# Patient Record
Sex: FEMALE | Race: ASIAN | NOT HISPANIC OR LATINO | Employment: STUDENT | URBAN - METROPOLITAN AREA
[De-identification: names, ages, dates, MRNs, and addresses within clinical notes are randomized per-mention and may not be internally consistent; named-entity substitution may affect disease eponyms.]

---

## 2020-09-17 ENCOUNTER — HOSPITAL ENCOUNTER (EMERGENCY)
Facility: HOSPITAL | Age: 20
Discharge: HOME | End: 2020-09-17
Attending: EMERGENCY MEDICINE | Admitting: EMERGENCY MEDICINE
Payer: COMMERCIAL

## 2020-09-17 VITALS
BODY MASS INDEX: 18.4 KG/M2 | RESPIRATION RATE: 20 BRPM | TEMPERATURE: 98.3 F | WEIGHT: 100 LBS | DIASTOLIC BLOOD PRESSURE: 72 MMHG | SYSTOLIC BLOOD PRESSURE: 106 MMHG | HEIGHT: 62 IN | OXYGEN SATURATION: 98 % | HEART RATE: 73 BPM

## 2020-09-17 DIAGNOSIS — H00.015 HORDEOLUM EXTERNUM LEFT LOWER EYELID: Primary | ICD-10-CM

## 2020-09-17 PROCEDURE — 99282 EMERGENCY DEPT VISIT SF MDM: CPT | Performed by: EMERGENCY MEDICINE

## 2020-09-17 PROCEDURE — 99283 EMERGENCY DEPT VISIT LOW MDM: CPT

## 2020-09-17 NOTE — ED ATTENDING ATTESTATION
9/17/2020  I, Alysha Argueta MD, saw and evaluated the patient  I have discussed the patient with the resident/non-physician practitioner and agree with the resident's/non-physician practitioner's findings, Plan of Care, and MDM as documented in the resident's/non-physician practitioner's note, except where noted  All available labs and Radiology studies were reviewed  I was present for key portions of any procedure(s) performed by the resident/non-physician practitioner and I was immediately available to provide assistance  At this point I agree with the current assessment done in the Emergency Department  I have conducted an independent evaluation of this patient a history and physical is as follows:    ED Course         Critical Care Time  Procedures    21 yo female noted swelling to left lower eyelid, thought it was sty and using warm compresses with no relief  No change in vision, no conjunctival redness, no pain  No hx of same  No fever  No pmh  Vss, afebrile, lungs cta, rrr, hordeolum on  Left lower eyelid

## 2020-09-18 NOTE — ED PROVIDER NOTES
History  Chief Complaint   Patient presents with    Eye Pain     Pt states having a bump on her left lower eyelid since sunday that keeps getting bigger  Pt complaining that it is painful  79-year-old female with no significant past medical history presents to the emergency department for lower eyelid swelling  The patient reports the swelling started approximately 5 days ago  She reports that she initially thought it was a stye so purchased an over-the-counter cream to apply to the area and was also using warm compresses  The patient reports that the swelling has not gone away so she came to the emergency department for further evaluation  The patient denies having prior history of styes  She also denies fever, chills, nausea, vomiting, diarrhea, headache, blurry vision and pain with eye movement  None       History reviewed  No pertinent past medical history  History reviewed  No pertinent surgical history  History reviewed  No pertinent family history  I have reviewed and agree with the history as documented  E-Cigarette/Vaping    E-Cigarette Use Never User      E-Cigarette/Vaping Substances    Nicotine No     THC No     CBD No     Flavoring No     Other No     Unknown No      Social History     Tobacco Use    Smoking status: Never Smoker    Smokeless tobacco: Never Used   Substance Use Topics    Alcohol use: Never     Frequency: Never    Drug use: Yes     Types: Marijuana        Review of Systems   Constitutional: Negative for chills and fever  HENT: Negative for congestion, sore throat and voice change  Eyes: Negative for photophobia and visual disturbance  Respiratory: Negative for cough, chest tightness and shortness of breath  Cardiovascular: Negative for chest pain and palpitations  Gastrointestinal: Negative for abdominal pain, constipation, diarrhea, nausea and vomiting  Endocrine: Negative for polyuria     Genitourinary: Negative for difficulty urinating, dysuria, flank pain and urgency  Musculoskeletal: Negative for back pain and neck pain  Skin: Positive for wound  Negative for pallor and rash  Neurological: Negative for dizziness, syncope, weakness, light-headedness, numbness and headaches  Psychiatric/Behavioral: Negative for agitation and confusion  All other systems reviewed and are negative  Physical Exam  ED Triage Vitals [09/17/20 1709]   Temperature Pulse Respirations Blood Pressure SpO2   98 3 °F (36 8 °C) 73 20 106/72 98 %      Temp Source Heart Rate Source Patient Position - Orthostatic VS BP Location FiO2 (%)   Oral Monitor Sitting Left arm --      Pain Score       8             Orthostatic Vital Signs  Vitals:    09/17/20 1709   BP: 106/72   Pulse: 73   Patient Position - Orthostatic VS: Sitting       Physical Exam  Vitals signs and nursing note reviewed  Constitutional:       Appearance: She is well-developed  HENT:      Head: Normocephalic and atraumatic  Eyes:      General:         Right eye: No discharge or hordeolum  Left eye: Hordeolum present  No discharge  Extraocular Movements:      Right eye: Normal extraocular motion  Left eye: Normal extraocular motion  Conjunctiva/sclera: Conjunctivae normal       Pupils: Pupils are equal, round, and reactive to light  Visual Fields: Right eye visual fields normal and left eye visual fields normal      Neck:      Musculoskeletal: Normal range of motion and neck supple  Cardiovascular:      Rate and Rhythm: Normal rate and regular rhythm  Heart sounds: Normal heart sounds  Pulmonary:      Effort: Pulmonary effort is normal       Breath sounds: Normal breath sounds  Abdominal:      General: Bowel sounds are normal  There is no distension  Palpations: Abdomen is soft  Tenderness: There is no abdominal tenderness  There is no guarding or rebound  Skin:     General: Skin is warm and dry        Capillary Refill: Capillary refill takes less than 2 seconds  Neurological:      Mental Status: She is alert and oriented to person, place, and time  ED Medications  Medications - No data to display    Diagnostic Studies  Results Reviewed     None                 No orders to display         Procedures  Procedures      ED Course                                       MDM  Number of Diagnoses or Management Options  Hordeolum externum left lower eyelid:   Diagnosis management comments: 18-year-old female presented to the emergency department for evaluation of left lower eyelid swelling  On arrival the patient was awake, alert, oriented and in no acute distress  The patient's vital signs were stable and she was afebrile  The patient's physical exam was consistent with a hordeolum of the lower left eyelid  Treatment options were discussed with the patient with recommendation to continue the over-the-counter cream and warm compresses  The patient was provided with contact information to establish care with a PCP as well as information for Ophthalmology with recommendation to establish care with a PCP and follow up for continued symptoms and to schedule appointment with Ophthalmology if the swelling gets worse  Return precautions were discussed  Patient agrees with the plan for discharge and feels comfortable to go home with proper f/u  Advised to return for worsening or additional problems  Diagnostic tests were reviewed and questions answered  Diagnosis, care plan and treatment options were discussed  The patient understands instructions and will follow up as directed            Disposition  Final diagnoses:   Hordeolum externum left lower eyelid     Time reflects when diagnosis was documented in both MDM as applicable and the Disposition within this note     Time User Action Codes Description Comment    9/17/2020  6:18 PM Alicia Paz Add [V18 [id number]] Hordeolum externum left lower eyelid       ED Disposition     ED Disposition Condition Date/Time Comment    Discharge Stable Thu Sep 17, 2020  6:18 PM Sudarshan Calle discharge to home/self care  Follow-up Information     Follow up With Specialties Details Why Contact Info Additional [address] Emergency Department Emergency Medicine Go to  If symptoms worsen [address]  [phone]  ED, [address], [facility] 108    Jeremiah Hodgkin, MD Ophthalmology   [address] Internal Medicine Schedule an appointment as soon as possible for a visit  As needed [address] [facility] [address] [facility] [address]  [address] [place] [address], [address] [place] [address], [place], [place], [address] [phone]          There are no discharge medications for this patient  No discharge procedures on file  PDMP Review     None           ED Provider  Attending physically available and evaluated Lorri Pacheco I managed the patient along with the ED Attending      Electronically Signed by         Zeus Cunningham MD  09/18/20 7272

## 2021-04-30 ENCOUNTER — HOSPITAL ENCOUNTER (INPATIENT)
Facility: HOSPITAL | Age: 21
LOS: 3 days | Discharge: OTHER INSTITUTION | DRG: 918 | End: 2021-05-03
Attending: EMERGENCY MEDICINE | Admitting: INTERNAL MEDICINE
Payer: COMMERCIAL

## 2021-04-30 DIAGNOSIS — R11.0 NAUSEA: ICD-10-CM

## 2021-04-30 DIAGNOSIS — R94.31 PROLONGED Q-T INTERVAL ON ECG: ICD-10-CM

## 2021-04-30 DIAGNOSIS — T50.902A INTENTIONAL DRUG OVERDOSE, INITIAL ENCOUNTER (HCC): Primary | ICD-10-CM

## 2021-04-30 DIAGNOSIS — T14.91XA SUICIDE ATTEMPT (HCC): ICD-10-CM

## 2021-04-30 PROBLEM — T39.1X2A TYLENOL OVERDOSE, INTENTIONAL SELF-HARM, INITIAL ENCOUNTER (HCC): Status: ACTIVE | Noted: 2021-04-30

## 2021-04-30 PROBLEM — E87.6 HYPOKALEMIA: Status: ACTIVE | Noted: 2021-04-30

## 2021-04-30 LAB
ALBUMIN SERPL BCP-MCNC: 3.9 G/DL (ref 3.5–5)
ALBUMIN SERPL BCP-MCNC: 3.9 G/DL (ref 3.5–5)
ALBUMIN SERPL BCP-MCNC: 4.5 G/DL (ref 3.5–5)
ALP SERPL-CCNC: 64 U/L (ref 46–116)
ALP SERPL-CCNC: 67 U/L (ref 46–116)
ALP SERPL-CCNC: 81 U/L (ref 46–116)
ALT SERPL W P-5'-P-CCNC: 15 U/L (ref 12–78)
ALT SERPL W P-5'-P-CCNC: 17 U/L (ref 12–78)
ALT SERPL W P-5'-P-CCNC: 17 U/L (ref 12–78)
AMPHETAMINES SERPL QL SCN: NEGATIVE
ANION GAP SERPL CALCULATED.3IONS-SCNC: 10 MMOL/L (ref 4–13)
ANION GAP SERPL CALCULATED.3IONS-SCNC: 11 MMOL/L (ref 4–13)
ANION GAP SERPL CALCULATED.3IONS-SCNC: 7 MMOL/L (ref 4–13)
APAP SERPL-MCNC: 111 UG/ML (ref 10–20)
APAP SERPL-MCNC: 25 UG/ML (ref 10–20)
APAP SERPL-MCNC: 4 UG/ML (ref 10–20)
APTT PPP: 27 SECONDS (ref 23–37)
AST SERPL W P-5'-P-CCNC: 11 U/L (ref 5–45)
AST SERPL W P-5'-P-CCNC: 12 U/L (ref 5–45)
AST SERPL W P-5'-P-CCNC: 14 U/L (ref 5–45)
ATRIAL RATE: 83 BPM
ATRIAL RATE: 91 BPM
BARBITURATES UR QL: NEGATIVE
BASE EX.OXY STD BLDV CALC-SCNC: 47 % (ref 60–80)
BASE EXCESS BLDV CALC-SCNC: -2.6 MMOL/L
BASOPHILS # BLD AUTO: 0.02 THOUSANDS/ΜL (ref 0–0.1)
BASOPHILS NFR BLD AUTO: 0 % (ref 0–1)
BENZODIAZ UR QL: NEGATIVE
BILIRUB SERPL-MCNC: 0.39 MG/DL (ref 0.2–1)
BILIRUB SERPL-MCNC: 0.57 MG/DL (ref 0.2–1)
BILIRUB SERPL-MCNC: 0.6 MG/DL (ref 0.2–1)
BUN SERPL-MCNC: 4 MG/DL (ref 5–25)
BUN SERPL-MCNC: 5 MG/DL (ref 5–25)
BUN SERPL-MCNC: 9 MG/DL (ref 5–25)
CALCIUM SERPL-MCNC: 8.6 MG/DL (ref 8.3–10.1)
CALCIUM SERPL-MCNC: 8.7 MG/DL (ref 8.3–10.1)
CALCIUM SERPL-MCNC: 8.8 MG/DL (ref 8.3–10.1)
CHLORIDE SERPL-SCNC: 107 MMOL/L (ref 100–108)
CHLORIDE SERPL-SCNC: 107 MMOL/L (ref 100–108)
CHLORIDE SERPL-SCNC: 109 MMOL/L (ref 100–108)
CO2 SERPL-SCNC: 20 MMOL/L (ref 21–32)
CO2 SERPL-SCNC: 22 MMOL/L (ref 21–32)
CO2 SERPL-SCNC: 22 MMOL/L (ref 21–32)
COCAINE UR QL: NEGATIVE
CREAT SERPL-MCNC: 0.78 MG/DL (ref 0.6–1.3)
CREAT SERPL-MCNC: 0.81 MG/DL (ref 0.6–1.3)
CREAT SERPL-MCNC: 0.96 MG/DL (ref 0.6–1.3)
EOSINOPHIL # BLD AUTO: 0 THOUSAND/ΜL (ref 0–0.61)
EOSINOPHIL NFR BLD AUTO: 0 % (ref 0–6)
ERYTHROCYTE [DISTWIDTH] IN BLOOD BY AUTOMATED COUNT: 13.9 % (ref 11.6–15.1)
ETHANOL EXG-MCNC: 0 MG/DL
ETHANOL SERPL-MCNC: <3 MG/DL (ref 0–3)
GFR SERPL CREATININE-BSD FRML MDRD: 104 ML/MIN/1.73SQ M
GFR SERPL CREATININE-BSD FRML MDRD: 109 ML/MIN/1.73SQ M
GFR SERPL CREATININE-BSD FRML MDRD: 85 ML/MIN/1.73SQ M
GLUCOSE SERPL-MCNC: 141 MG/DL (ref 65–140)
GLUCOSE SERPL-MCNC: 151 MG/DL (ref 65–140)
GLUCOSE SERPL-MCNC: 99 MG/DL (ref 65–140)
HCG SERPL QL: NEGATIVE
HCO3 BLDV-SCNC: 22.4 MMOL/L (ref 24–30)
HCT VFR BLD AUTO: 42.5 % (ref 34.8–46.1)
HGB BLD-MCNC: 13.5 G/DL (ref 11.5–15.4)
IMM GRANULOCYTES # BLD AUTO: 0.04 THOUSAND/UL (ref 0–0.2)
IMM GRANULOCYTES NFR BLD AUTO: 1 % (ref 0–2)
INR PPP: 1.18 (ref 0.84–1.19)
INR PPP: 1.25 (ref 0.84–1.19)
INR PPP: 1.26 (ref 0.84–1.19)
LYMPHOCYTES # BLD AUTO: 0.91 THOUSANDS/ΜL (ref 0.6–4.47)
LYMPHOCYTES NFR BLD AUTO: 12 % (ref 14–44)
MAGNESIUM SERPL-MCNC: 2.4 MG/DL (ref 1.6–2.6)
MCH RBC QN AUTO: 25.4 PG (ref 26.8–34.3)
MCHC RBC AUTO-ENTMCNC: 31.8 G/DL (ref 31.4–37.4)
MCV RBC AUTO: 80 FL (ref 82–98)
METHADONE UR QL: NEGATIVE
MONOCYTES # BLD AUTO: 0.35 THOUSAND/ΜL (ref 0.17–1.22)
MONOCYTES NFR BLD AUTO: 5 % (ref 4–12)
NEUTROPHILS # BLD AUTO: 6.28 THOUSANDS/ΜL (ref 1.85–7.62)
NEUTS SEG NFR BLD AUTO: 82 % (ref 43–75)
NRBC BLD AUTO-RTO: 0 /100 WBCS
O2 CT BLDV-SCNC: 9.6 ML/DL
OPIATES UR QL SCN: NEGATIVE
OXYCODONE+OXYMORPHONE UR QL SCN: NEGATIVE
P AXIS: 63 DEGREES
P AXIS: 66 DEGREES
PCO2 BLDV: 39.7 MM HG (ref 42–50)
PCP UR QL: NEGATIVE
PH BLDV: 7.37 [PH] (ref 7.3–7.4)
PLATELET # BLD AUTO: 416 THOUSANDS/UL (ref 149–390)
PMV BLD AUTO: 10 FL (ref 8.9–12.7)
PO2 BLDV: 27.2 MM HG (ref 35–45)
POTASSIUM SERPL-SCNC: 3.3 MMOL/L (ref 3.5–5.3)
POTASSIUM SERPL-SCNC: 3.5 MMOL/L (ref 3.5–5.3)
POTASSIUM SERPL-SCNC: 3.7 MMOL/L (ref 3.5–5.3)
PR INTERVAL: 80 MS
PR INTERVAL: 88 MS
PROT SERPL-MCNC: 7.5 G/DL (ref 6.4–8.2)
PROT SERPL-MCNC: 7.7 G/DL (ref 6.4–8.2)
PROT SERPL-MCNC: 8.3 G/DL (ref 6.4–8.2)
PROTHROMBIN TIME: 15 SECONDS (ref 11.6–14.5)
PROTHROMBIN TIME: 15.7 SECONDS (ref 11.6–14.5)
PROTHROMBIN TIME: 15.8 SECONDS (ref 11.6–14.5)
QRS AXIS: 62 DEGREES
QRS AXIS: 72 DEGREES
QRSD INTERVAL: 72 MS
QRSD INTERVAL: 76 MS
QT INTERVAL: 330 MS
QT INTERVAL: 468 MS
QTC INTERVAL: 405 MS
QTC INTERVAL: 549 MS
RBC # BLD AUTO: 5.32 MILLION/UL (ref 3.81–5.12)
SALICYLATES SERPL-MCNC: 10 MG/DL (ref 3–20)
SALICYLATES SERPL-MCNC: 14 MG/DL (ref 3–20)
SARS-COV-2 RNA RESP QL NAA+PROBE: NEGATIVE
SODIUM SERPL-SCNC: 137 MMOL/L (ref 136–145)
SODIUM SERPL-SCNC: 138 MMOL/L (ref 136–145)
SODIUM SERPL-SCNC: 140 MMOL/L (ref 136–145)
T WAVE AXIS: 25 DEGREES
T WAVE AXIS: 41 DEGREES
THC UR QL: NEGATIVE
VENTRICULAR RATE: 83 BPM
VENTRICULAR RATE: 91 BPM
WBC # BLD AUTO: 7.6 THOUSAND/UL (ref 4.31–10.16)

## 2021-04-30 PROCEDURE — 80143 DRUG ASSAY ACETAMINOPHEN: CPT | Performed by: NURSE PRACTITIONER

## 2021-04-30 PROCEDURE — 80053 COMPREHEN METABOLIC PANEL: CPT | Performed by: NURSE PRACTITIONER

## 2021-04-30 PROCEDURE — 82075 ASSAY OF BREATH ETHANOL: CPT | Performed by: EMERGENCY MEDICINE

## 2021-04-30 PROCEDURE — 82805 BLOOD GASES W/O2 SATURATION: CPT | Performed by: EMERGENCY MEDICINE

## 2021-04-30 PROCEDURE — 82077 ASSAY SPEC XCP UR&BREATH IA: CPT | Performed by: EMERGENCY MEDICINE

## 2021-04-30 PROCEDURE — 96375 TX/PRO/DX INJ NEW DRUG ADDON: CPT

## 2021-04-30 PROCEDURE — 99243 OFF/OP CNSLTJ NEW/EST LOW 30: CPT | Performed by: PSYCHIATRY & NEUROLOGY

## 2021-04-30 PROCEDURE — 80307 DRUG TEST PRSMV CHEM ANLYZR: CPT | Performed by: EMERGENCY MEDICINE

## 2021-04-30 PROCEDURE — 36415 COLL VENOUS BLD VENIPUNCTURE: CPT | Performed by: EMERGENCY MEDICINE

## 2021-04-30 PROCEDURE — 85610 PROTHROMBIN TIME: CPT | Performed by: NURSE PRACTITIONER

## 2021-04-30 PROCEDURE — U0003 INFECTIOUS AGENT DETECTION BY NUCLEIC ACID (DNA OR RNA); SEVERE ACUTE RESPIRATORY SYNDROME CORONAVIRUS 2 (SARS-COV-2) (CORONAVIRUS DISEASE [COVID-19]), AMPLIFIED PROBE TECHNIQUE, MAKING USE OF HIGH THROUGHPUT TECHNOLOGIES AS DESCRIBED BY CMS-2020-01-R: HCPCS | Performed by: EMERGENCY MEDICINE

## 2021-04-30 PROCEDURE — 85730 THROMBOPLASTIN TIME PARTIAL: CPT | Performed by: EMERGENCY MEDICINE

## 2021-04-30 PROCEDURE — 93005 ELECTROCARDIOGRAM TRACING: CPT

## 2021-04-30 PROCEDURE — 96365 THER/PROPH/DIAG IV INF INIT: CPT

## 2021-04-30 PROCEDURE — U0005 INFEC AGEN DETEC AMPLI PROBE: HCPCS | Performed by: EMERGENCY MEDICINE

## 2021-04-30 PROCEDURE — 80053 COMPREHEN METABOLIC PANEL: CPT | Performed by: INTERNAL MEDICINE

## 2021-04-30 PROCEDURE — 93010 ELECTROCARDIOGRAM REPORT: CPT | Performed by: INTERNAL MEDICINE

## 2021-04-30 PROCEDURE — 99255 IP/OBS CONSLTJ NEW/EST HI 80: CPT | Performed by: EMERGENCY MEDICINE

## 2021-04-30 PROCEDURE — 80053 COMPREHEN METABOLIC PANEL: CPT | Performed by: EMERGENCY MEDICINE

## 2021-04-30 PROCEDURE — 99285 EMERGENCY DEPT VISIT HI MDM: CPT

## 2021-04-30 PROCEDURE — 85025 COMPLETE CBC W/AUTO DIFF WBC: CPT | Performed by: EMERGENCY MEDICINE

## 2021-04-30 PROCEDURE — 80179 DRUG ASSAY SALICYLATE: CPT | Performed by: EMERGENCY MEDICINE

## 2021-04-30 PROCEDURE — 84703 CHORIONIC GONADOTROPIN ASSAY: CPT | Performed by: EMERGENCY MEDICINE

## 2021-04-30 PROCEDURE — 83735 ASSAY OF MAGNESIUM: CPT | Performed by: NURSE PRACTITIONER

## 2021-04-30 PROCEDURE — 96361 HYDRATE IV INFUSION ADD-ON: CPT

## 2021-04-30 PROCEDURE — 99291 CRITICAL CARE FIRST HOUR: CPT | Performed by: EMERGENCY MEDICINE

## 2021-04-30 PROCEDURE — 85610 PROTHROMBIN TIME: CPT | Performed by: EMERGENCY MEDICINE

## 2021-04-30 PROCEDURE — 99223 1ST HOSP IP/OBS HIGH 75: CPT | Performed by: INTERNAL MEDICINE

## 2021-04-30 PROCEDURE — 80143 DRUG ASSAY ACETAMINOPHEN: CPT | Performed by: EMERGENCY MEDICINE

## 2021-04-30 RX ORDER — MAGNESIUM SULFATE HEPTAHYDRATE 40 MG/ML
2 INJECTION, SOLUTION INTRAVENOUS ONCE
Status: COMPLETED | OUTPATIENT
Start: 2021-04-30 | End: 2021-04-30

## 2021-04-30 RX ORDER — POTASSIUM CHLORIDE 20 MEQ/1
40 TABLET, EXTENDED RELEASE ORAL ONCE
Status: COMPLETED | OUTPATIENT
Start: 2021-04-30 | End: 2021-04-30

## 2021-04-30 RX ORDER — ONDANSETRON 4 MG/1
4 TABLET, ORALLY DISINTEGRATING ORAL ONCE
Status: COMPLETED | OUTPATIENT
Start: 2021-04-30 | End: 2021-04-30

## 2021-04-30 RX ORDER — SODIUM CHLORIDE, SODIUM GLUCONATE, SODIUM ACETATE, POTASSIUM CHLORIDE, MAGNESIUM CHLORIDE, SODIUM PHOSPHATE, DIBASIC, AND POTASSIUM PHOSPHATE .53; .5; .37; .037; .03; .012; .00082 G/100ML; G/100ML; G/100ML; G/100ML; G/100ML; G/100ML; G/100ML
75 INJECTION, SOLUTION INTRAVENOUS CONTINUOUS
Status: DISCONTINUED | OUTPATIENT
Start: 2021-04-30 | End: 2021-05-01

## 2021-04-30 RX ORDER — ONDANSETRON 2 MG/ML
4 INJECTION INTRAMUSCULAR; INTRAVENOUS ONCE
Status: COMPLETED | OUTPATIENT
Start: 2021-04-30 | End: 2021-04-30

## 2021-04-30 RX ORDER — METOCLOPRAMIDE HYDROCHLORIDE 5 MG/ML
10 INJECTION INTRAMUSCULAR; INTRAVENOUS ONCE
Status: COMPLETED | OUTPATIENT
Start: 2021-04-30 | End: 2021-04-30

## 2021-04-30 RX ADMIN — SODIUM CHLORIDE, SODIUM GLUCONATE, SODIUM ACETATE, POTASSIUM CHLORIDE, MAGNESIUM CHLORIDE, SODIUM PHOSPHATE, DIBASIC, AND POTASSIUM PHOSPHATE 75 ML/HR: .53; .5; .37; .037; .03; .012; .00082 INJECTION, SOLUTION INTRAVENOUS at 17:20

## 2021-04-30 RX ADMIN — PYRIDOXINE HYDROCHLORIDE 25 MG: 100 INJECTION, SOLUTION INTRAMUSCULAR; INTRAVENOUS at 10:21

## 2021-04-30 RX ADMIN — ONDANSETRON 4 MG: 2 INJECTION INTRAMUSCULAR; INTRAVENOUS at 05:02

## 2021-04-30 RX ADMIN — ACETYLCYSTEINE 2270 MG: 6 INJECTION, SOLUTION INTRAVENOUS at 07:25

## 2021-04-30 RX ADMIN — METOCLOPRAMIDE HYDROCHLORIDE 10 MG: 5 INJECTION INTRAMUSCULAR; INTRAVENOUS at 06:31

## 2021-04-30 RX ADMIN — POTASSIUM CHLORIDE 40 MEQ: 1500 TABLET, EXTENDED RELEASE ORAL at 10:22

## 2021-04-30 RX ADMIN — FAMOTIDINE 20 MG: 10 INJECTION INTRAVENOUS at 10:21

## 2021-04-30 RX ADMIN — SODIUM CHLORIDE 1000 ML: 0.9 INJECTION, SOLUTION INTRAVENOUS at 04:57

## 2021-04-30 RX ADMIN — MAGNESIUM SULFATE HEPTAHYDRATE 2 G: 40 INJECTION, SOLUTION INTRAVENOUS at 06:06

## 2021-04-30 RX ADMIN — ACETYLCYSTEINE 6810 MG: 6 INJECTION, SOLUTION INTRAVENOUS at 05:41

## 2021-04-30 RX ADMIN — ACETYLCYSTEINE 4540 MG: 200 INJECTION, SOLUTION INTRAVENOUS at 11:47

## 2021-04-30 RX ADMIN — SODIUM CHLORIDE, SODIUM GLUCONATE, SODIUM ACETATE, POTASSIUM CHLORIDE, MAGNESIUM CHLORIDE, SODIUM PHOSPHATE, DIBASIC, AND POTASSIUM PHOSPHATE 150 ML/HR: .53; .5; .37; .037; .03; .012; .00082 INJECTION, SOLUTION INTRAVENOUS at 10:22

## 2021-04-30 RX ADMIN — POTASSIUM CHLORIDE 40 MEQ: 1500 TABLET, EXTENDED RELEASE ORAL at 15:53

## 2021-04-30 NOTE — ED ATTENDING ATTESTATION
4/30/2021  ICassius MD, saw and evaluated the patient  I have discussed the patient with the resident/non-physician practitioner and agree with the resident's/non-physician practitioner's findings, Plan of Care, and MDM as documented in the resident's/non-physician practitioner's note, except where noted  All available labs and Radiology studies were reviewed  I was present for key portions of any procedure(s) performed by the resident/non-physician practitioner and I was immediately available to provide assistance  At this point I agree with the current assessment done in the Emergency Department  I have conducted an independent evaluation of this patient a history and physical is as follows:    ED Course  Patient is a 70-year-old female with attention all polypharmacy overdose she states she took 6 is Z quil plus 10 Excedrin +10 Midol +10 extra-strength Tylenol  Ingestion was approximately 11:00 p m  Tonight  Ingestion was intentional with the attempt to harm herself  She denies any prior history of depression or prior suicide attempt  Patient complains of upper abdominal pain vomiting at this time period    Normal reactive pupils  Nonicteric sclera  Heart is regular rate rhythm without murmurs lungs are clear  Neuro exam is currently nonfocal   Abdomen is soft with right upper quadrant and epigastric tenderness to palpation without guarding or rebound  Impression:  Polypharmacy overdose  Will check coma panel screening labs given amount of acetaminophen ingested per patient's report will proceed with starting and acetylcysteine as I suspect she took a significant amount of Tylenol tonight and will require treatment  Will check ECG antiemetics IV fluids  Anticipate admission  Lab studies reviewed patient has acetaminophen level of 111 at at 7 hour jorge  Will treat with Acedote  Admit  Consult toxicology      Critical Care Time  CriticalCare Time  Performed by: Cassius Henry MD  Authorized by: Celia Saenz MD     Critical care provider statement:     Critical care time (minutes):  37    Critical care time was exclusive of:  Separately billable procedures and treating other patients and teaching time    Critical care was necessary to treat or prevent imminent or life-threatening deterioration of the following conditions:  Toxidrome    Critical care was time spent personally by me on the following activities:  Obtaining history from patient or surrogate, evaluation of patient's response to treatment, examination of patient, ordering and performing treatments and interventions, ordering and review of laboratory studies and re-evaluation of patient's condition    I assumed direction of critical care for this patient from another provider in my specialty: no

## 2021-04-30 NOTE — CONSULTS
Consultation - Medical Toxicology  [address] [facility] Luc 23 y o  female MRN: [id number]  Unit/Bed#: ED 23 Encounter: [id number]    Reason for Consult / Principal Problem: overdose    Inpatient consult to Toxicology  Consult performed by: Kim Saldana MD  Consult ordered by: Asad Gaffney DO        04/30/21    ASSESSMENT:  1  APAP overdose  2  Salicylate overdose  3  Ibuprofen overdose  4  Diphenhydramine overdose  5  Hypokalemia  6  Non anion gap metabolic acidemia  7  Prolonged QTC (resolved)  8  Suicidal ideation  9  Abdominal pain (resolved)  10  Nausea and vomiting (resolved)        RECOMMENDATIONS:    For this patient, her most significant overdose is her acetaminophen, which is currently being treated with N-acetylcysteine  Continue Q 6 CMP, INR, APAP checks  N-acetylcysteine can be stopped when it has been administered for at least 12 hours and LFTs are within normal limits/down trending x2, INR < 2, and APAP < 10  In terms of her ibuprofen, the toxic dose is 400 milligrams/kilogram and manifests often as metabolic acidosis, OTILIA, GI symptoms  The patient's GI symptoms have resolved, though she has a slight decrease in her bicarb on 11:00 a m  CMP  I would continue to repeat this to ensure that there is no worsening  The patient does not demonstrate any anticholinergic toxicity, which is what I would expect from a diphenhydramine overdose  Her original QTC greater than 500 has since short and after administration of magnesium  This patient can be cleared from a toxicologic standpoint when her labs have normalized and her N-acetylcysteine has been stopped along with having normal vital signs, normal mentation, and being ambulatory  For further questions, please call [facility]  Service or Patient Access Center to reach the medical  on call     Please see additional teaching note below (if available)    Medical Toxicology Teaching Note  8 [facility]  Acetaminophen Toxicity  Last revised October 2017     Acetaminophen (Tylenol) is a nonopiod analgesic and antipyretic medication found in many over-the-counter and prescription products such as Tylenol PM, Norco, Percocet, Nyquil, Vicks Formula 44-D  The recommended maximum daily dose of acetaminophen for adults is 3g/day, and 75-90mg/kg/day for children  Alcoholics may safely take Tylenol in therapeutic doses, but they may be at increased risk for hepatotoxicity in overdose  Mechanism of Toxicity: Acetaminophen is primarily metabolized by the liver  In therapeutic doses, about 90% of acetaminophen is conjugated to nontoxic metabolites (glucoronides and sulfates)  A small portion (<5%) is conjugated by cytochrome P450 enzyme, subunit CYP2E1, to a toxic metabolite, N-acetyl-p-benzoquinoneimine (NAPQI)  This metabolite is further conjugated by glutathione, to nontoxic metabolites eliminated by the kidneys  Liver Injury:  In toxic doses, the usual metabolic pathways are overwhelmed; acetaminophen is shunted to the cytochrome P450 pathway, creating NAPQI  Glutathione stores are depleted and NAPQI is produced  Cellular injury and hepatic necrosis may occur as NAPQI accumulates  Renal Injury:  Cytochrome P450 activity in the kidneys is thought to cause direct renal damage  Renal insufficiency may also develop during fulminant hepatic failure due to hepatorenal syndrome  Renal toxicity is usually associated with liver injury  Pharmacokinetics:  Acetaminophen is rapidly absorbed  Peak levels occur within  minutes with normal doses  Delayed absorption may occur with sustained release products or with co-ingestions that slow the GI tract (opiods, anticholinergics)  The elimination half-life is 1-3 hours after therapeutic doses and may extend to 12 hours after overdose  Toxic Dose:  Toxicity in adults may occur with acute ingestions of 7g, and 200mg/kg in children   Hepatic injury following chronic ingestions may occur at any dose above the daily recommended dose  Clinical Presentation:    Acute Ingestion: Within 8 hrs of an acute ingestion, there are usually few symptoms  Between 8-30 hours after a toxic, acute ingestion, a transaminitis will develop  Nausea, vomiting, and right upper quadrant pain may occur  Within 12-36 hours, worsening AST/ALT develops with elevated bilirubin and INR  The most severe cases will develop fulminant liver failure with hepatic encephalopathy and acidosis, usually within 3-7 days post overdose  The patient should be evaluated for a liver transplantation  Repeated Supra-therapeutic Ingestion: Due to a sub-acute course, patients may present anywhere along a spectrum - normal LFTS to asymptomatic elevation of enzymes to hepatic failure  Diagnosis   Acute Ingestion (Time of Ingestion Known): After an acute ingestion at a known time, obtain a 4-hour post-ingestion serum acetaminophen level and plot the level on the Theo-Luis nomogram (see below)  This nomogram is used to predict the likelihood of hepatic toxicity based on the level of acetaminophen between 4 and 24 hours post-ingestion  The nomogram CANNOT be used if the time of ingestion is unknown  The dotted line (Rumack-Michael line), marking a 4-hour level at 200 mcg/ml, is the original line developed from the study above which hepatic toxicity will probably occur  The solid line (Treatment Line), marking a 4-hour level at 150ug/ml  is the treatment line accepted as the standard of care in the [place] and is 25% lower as a safety margin  If the patients serum APAP level falls above the treatment line, start treatment with N-acetylcysteine (NAC)  (see Treatment below)           Acute Ingestion (Time of Ingestion Unknown) or Repeated Supra-therapeutic Ingestion An acetaminophen level CANNOT be plotted on the Rumabdoulaye-Michaels nomogram  Draw an APAP level and AST/ALT at time of presentation   Anyone with an APAP level> 10mcg/ml OR elevated AST/ALT should start NAC  (see Treatment below)     TREATMENT   Emergency and Supportive Care: Treat nausea and vomiting to protect airway and support safe administration of charcoal and NAC, when indicated (see below)  Provide standard supportive care for liver and renal failure  Contact liver transplant team if fulminant hepatic failure occurs  Decontamination:  Administer activated charcoal within 2 hours of ingestion (consider later if extended release preparations)  Use antiemetics for nausea  Activated charcoal does bind to NAC, but the effect is not thought to be clinically significant  Gastric emptying is not recommended  Specific Drugs and Antidotes  Acute Ingestion Treat with NAC if the APAP level falls above the Treatment Line on the nomogram  The maximal benefit occurs if given within 8 hours of acute ingestion  Therefore, it is recommended to empirically start NAC before a level is obtained if there is a reasonable concern of a toxic ingestion presenting close to 8 hours or beyond  In late presenters (>8hrs), start NAC and treat for a full course or longer if LFTS remain abnormal  Treatment maybe stopped when AST/ALT peak and then downtrend, with an INR <2 and patient is clinically well  If abnormal labs persist, continue NAC and call Toxicology  There are two routes of administration for NAC, oral and IV  The treatment protocols are described below  Acute Ingestion (Time of Ingestion Unknown) or Repeated Supra-therapeutic Ingestion   The nomogram CANNOT be used to estimate the risk of hepatotoxicity  At presentation, check a serum APAP level and AST/ALT  If the APAP level is above 10 mcg/ml or the AST/ALT are elevated, start NAC treatment for 12 hours  If abnormalities persist, continue NAC treatment and call toxicology  If the APAP level is undetectable and AST and ALT are downtrending at the end of 12 hours, treatment may be stopped       Intravenous (Acetadote)   Loading dose- 150mg/kg infused over 15-60 minutes   Maintenance Infusion #1- 50mg/kg (12 5mg/kg/hr) over 4 hours   Maintenance Infusion #2 -100mg/kg (6 25 mg/kg/hr) until treatment endpoint   Treatment Endpoint: 20 hours or more   NAC should be continued for the full course  NAC can be stopped when APAP is undetectable, AST/ALT have peaked and are downtrending, and patient appears clinically well  Consultation with a medical  [address] [place] [address] is recommended before changes in the duration of therapy are made  Acetaminophen Toxicity Dos and Donts   Acute Ingestions   DO give charcoal for decontamination within 2 hours of ingestion if the patient can adequately protect their airway  DO start NAC empirically, i e  without an APAP level, if the ingestion is likely a large overdose presenting at 8 hours or more after ingestion  DO contact the Liver Transplant Team early if liver failure is developing  DO NOT get a level before 4hrs post-ingestion if the time of ingestion is certain in an acute overdose  DO NOT stop NAC therapy until full course is finished or truncated therapy is recommended by the [facility]  Repeated Supra-Therapeutic Ingestions (RSI)   DO ask patients with pain complaints (toothaches, back pain, cancer) about the amount of acetaminophen they use  DO NOT use the Rumabdoulaye-Woods nomogram to determine if the APAP level is toxic  DO NOT stop NAC therapy until full course is finished or truncated therapy is recommended by the [facility]  NAC Protocols   DO stop IV NAC if an anaphylactoid reaction occurs (rare)  Treat the reaction appropriately and call the [facility] for recommendations on continued NAC therapy  DO give charcoal with oral NAC when charcoal is indicated  References   Antony CORNEJO Acetaminophen  In [facility], [employer], [address] et al [facility]  Medical Toxicology 3rd edition  [place] PA: [address], 2004: pp [phone]  Evert Nageotte KR Acetaminophen  In [facility]       Ibuprofen Toxicity Discussion  Department Medical Toxicology   [address] [place] [address]     Ibuprofen is a commonly utilized nonsteroidal anti-inflammatory drug for analgesia and antipyresis  Ibuprofen inhibits the cyclooxygenase enzyme leading to decreased prostataglandin production and decreased pain and inflammation  It is the decreased prostataglandin production that contributes to irritation of the GI mucosa  Platelets are also affected by a thromboxane A2 inhibition  This can potentially contribute to gastrointestinal bleeding  In addition, prostataglandin may also cause vasodilatory reaction and natriuretic activity in the kidney, increasing sodium and water retention and potentially leading to acute renal failure  Most ibuprofen toxicities in overdose are either asymptomatic or resolve with supportive care  The most common symptoms in overdose are gastrointestinal symptoms including nausea, vomiting, abdominal pain  However, acute renal failure, central nervous system depression and seizures have also occurred  Metabolic acidosis, hypokalemia, hyponatremia, hyperkalemia, hypotension, bradycardia versus tachycardia have also occurred  Apneic episodes have been reported in Pediatrics  Symptoms may car with doses greater than 200 milligrams/kilogram; however a toxic dose is considered greater than 400 milligrams/kilogram  Treatment may include activated charcoal, supportive care, intravenous fluids, and dialysis for acute renal failure  Medical Toxicology  [address] [place] [address]  Anticholinergic Syndrome  Updated 03/04/2008    a- Background: Anticholinergic intoxication can occur from exposure to a wide variety of prescription and over-the-counter medications and numerous plants and mushrooms   Common drugs that have anticholinergic activities include: antihistamines, antipsychotics, antispasmodics, skeletal muscle relaxants, and tricyclic antidepressants (TCAs)  Plants and mushrooms containing anticholinergic alkaloids include: jimsonweed (Datura stramonium), deadly nightshade (Atropa belladonna), and fly agaric (Narda Monseabbey)  b- Mechanism of Toxicity:   Competitively antagonize the effect of acetylcholine at peripheral muscarinic receptors  They mostly affect exocrine glands (such as sweating and salivation) and smooth muscle  Inhibition of muscarinic activities in the heart leads to a rapid heartbeat   Pharmacokinetics: Absorption may be delayed due to its effect on GI motility  Duration of toxic effect can be quite prolonged (e g  benztropine  2-3 days)  c- Toxic dose: The range of toxicity is highly variable and unpredictable   Examples of Fatal doses from case reports:  i  Young Child: 1-2 mg Atropine, instilled in the eye  ii  Adult: 32 mg Atropine, IM injection   Minimal effect: increased heart rate and dry mouth after 360 mg of trospium chloride  d- Clinical presentation: Anticholinergic syndrome is characterized by warm, dry, flushed skin, dry mucous membranes, mydriasis, delirium, tachycardia, ileus, and urinary retention  Jerky myoclonic movements and choreoathetosis are common and can lead to rhabdomyolysis  Hyperthermia, coma, respiratory arrest and seizures may occur  e- Diagnosis: Based on history of exposure and typical features of anticholinergic syndrome  Trial dose of physostigmine can be used to confirm the diagnosis, especially with rapid reversal of the syndrome  f- Laboratory studies: Not generally available for the anticholinergics, but other labs can be useful such as electrolytes, glucose, CPK, and ECG  g- Treatment:    ABC   Seizure and muscular hyperactivity: Should be treated with benzodiazepines  Treat aggressively to prevent hyperthermia and rhabdomyolysis and their resultant complications  If unsuccessful use phenobarbital or propofol     Delirium: Treat with benzodiazepines, if resistant to benzodiazepines consider treating with physostigmine   GI decontamination maybe helpful in delayed presentation secondary to decreased GI motility provided that the patient is awake and alert   Enhanced elimination: Procedures such as hemodialysis and repeated-dose activated charcoal are not effective in removing anticholinergic agents   Physostigmine:   i  Pharmacology: Physostigmine is a carbamate and a reversible inhibitor of acetylcholinesterase  It increases acetylcholine concentration, causing stimulation of both muscarinic and nicotinic receptors  It also can penetrate the blood-brain barrier  It has nonspecific arousal effects  1  Onset of action: 3-8 minutes after parenteral administration  2  Duration of action: 30-90 minutes  3  Elimination half-life: 15-40 minutes  ii  Indications:  1  Severe anticholinergic syndrome from antimuscarinic agents  Generally used to reverse delirium resistant to benzodiazepines  2  Diagnostically: To differentiate functional psychosis from anticholinergic delirium   iii  Contraindications:  1  Should not be used as an antidote for cyclic antidepressant overdose because it may worsen cardiac conduction disturbance, cause bradyarrythmias or asystole, and aggravate or precipitate seizures  2  Do not use physostigmine with concurrent use of depolarizing neuromuscular blockers (e g  succinylcholine)  3  Known hypersensitivity to agent or preservative  4  Relative contraindication may include: Asthma, peripheral vascular disease, intestinal and bladder blockade, parkinsonian syndrome, and AV Block  iv  Adverse effects:  1  Bradycardia, heart block, and asystole  2  Seizure (particularly with rapid administration or excessive dose)  3  Nausea, vomiting, hypersalivation, and diarrhea  4  Bronchorrhea and bronchospasm  5  Fasciculation and muscle weakness  v  Dosage:  1  Adult: 0 5-2 mg slow IV push (£ 1 mg/min)    2  Children: 0 02 mg/kg slow IV push (£ 0 5 mg/min)  3  Repeat as needed every 10-30 minutes  4  Precautions: Patient should be on a cardiac monitor  Atropine should be kept at bedside to treat excessive muscarinic stimulation  5  Should not be administered IM or as a continuous infusion  6  It is better to undertreat than to overtreat  Physostigmine toxicity results when used in excess or in the absence of antimuscarinic toxicity  References:  Kaela Walker  Poisoning & Drug Overdose  2007  [place] Toxicologic Emergencies  2006  Hx and PE limited by: none    HPI: Lorrin Najjar is a 23y o  year old female who presents with intentional overdose of Tylenol, Excedrin, Midol, Zzzquil tablets at 11:00 p m  Yesterday evening  Patient presented to the emergency department several hours later after development of severe epigastric pain, nausea, vomiting  Patient denies any other ingestion  Denies being on any other medications  Patient was noted to have acetaminophen level 111 at 5 hours post ingestion and started on an-acetylcysteine  Salicylate level was noted to be 14  LFTs were within normal limits  QTC noted to be 579  Patient received magnesium  This afternoon, patient has no complaints and states that she has no abdominal pain, nausea, or vomiting and overall feels very well  Review of Systems   Constitutional: Negative for chills and fever  Respiratory: Negative for apnea, cough, choking, chest tightness, shortness of breath, wheezing and stridor  Cardiovascular: Negative for chest pain and leg swelling  Gastrointestinal: Negative for abdominal distention, abdominal pain, constipation, diarrhea, nausea and rectal pain  Genitourinary: Negative for dysuria and hematuria  Musculoskeletal: Negative for back pain, gait problem and neck pain  Skin: Negative for color change, pallor, rash and wound     Neurological: Negative for dizziness, tremors, seizures, syncope, facial asymmetry, speech difficulty, weakness, light-headedness, numbness and headaches  Historical Information   Past Medical History:   Diagnosis Date    Anxiety     Depression      History reviewed  No pertinent surgical history  Social History   Social History     Substance and Sexual Activity   Alcohol Use Yes    Frequency: 2-3 times a week    Drinks per session: 3 or 4     Social History     Substance and Sexual Activity   Drug Use Yes    Types: Marijuana     Social History     Tobacco Use   Smoking Status Never Smoker   Smokeless Tobacco Never Used     History reviewed  No pertinent family history  Prior to Admission medications    Not on File     Current Facility-Administered Medications   Medication Dose Route Frequency    acetylcysteine (ACETADOTE) 4,540 mg in dextrose 5 % 1,000 mL IVPB  100 mg/kg Intravenous Once    famotidine (PEPCID) injection 20 mg  20 mg Intravenous Q24H NAV    multi-electrolyte (PLASMALYTE-A/ISOLYTE-S PH 7 4) IV solution  150 mL/hr Intravenous Continuous    potassium chloride (K-DUR,KLOR-CON) CR tablet 40 mEq  40 mEq Oral Once     Allergies   Allergen Reactions    Seasonal Ic [Cholestatin] Eye Swelling     Objective     Intake/Output Summary (Last 24 hours) at 4/30/2021 1451  Last data filed at 4/30/2021 1144  Gross per 24 hour   Intake 1750 ml   Output --   Net 1750 ml       Invasive Devices:   Peripheral IV 04/30/21 Right Forearm (Active)   Site Assessment Clean;Dry; Intact 04/30/21 0456   Dressing Type Transparent 04/30/21 0456   Line Status Blood return noted 04/30/21 0456   Dressing Status Clean;Dry; Intact 04/30/21 0456       Peripheral IV 04/30/21 Left Antecubital (Active)   Site Assessment Clean;Dry; Intact 04/30/21 0606   Dressing Type Transparent 04/30/21 0606   Line Status Blood return noted 04/30/21 0606   Dressing Status Clean;Dry; Intact 04/30/21 0606       Vitals   Vitals:    04/30/21 1200 04/30/21 1308 04/30/21 1330 04/30/21 1400   BP: 114/73 124/92 124/66 109/59   TempSrc:       Pulse: 74 80 66 64 Resp: 16 18 16 16   Patient Position - Orthostatic VS:  Sitting     Temp:           Physical Exam  Vitals signs and nursing note reviewed  Constitutional:       General: She is not in acute distress  Appearance: She is normal weight  She is not ill-appearing, toxic-appearing or diaphoretic  HENT:      Head: Normocephalic and atraumatic  Right Ear: External ear normal       Left Ear: External ear normal       Nose: Nose normal       Mouth/Throat:      Mouth: Mucous membranes are moist    Eyes:      General:         Right eye: No discharge  Left eye: No discharge  Extraocular Movements: Extraocular movements intact  Conjunctiva/sclera: Conjunctivae normal       Pupils: Pupils are equal, round, and reactive to light  Neck:      Musculoskeletal: Neck supple  Cardiovascular:      Rate and Rhythm: Normal rate  Pulses: Normal pulses  Pulmonary:      Effort: Pulmonary effort is normal  No respiratory distress  Breath sounds: Normal breath sounds  No stridor  No wheezing, rhonchi or rales  Chest:      Chest wall: No tenderness  Abdominal:      General: Abdomen is flat  Bowel sounds are normal  There is no distension  Palpations: Abdomen is soft  There is no mass  Tenderness: There is no abdominal tenderness  There is no guarding or rebound  Hernia: No hernia is present  Musculoskeletal: Normal range of motion  General: No deformity or signs of injury  Left lower leg: No edema  Skin:     General: Skin is warm and dry  Capillary Refill: Capillary refill takes less than 2 seconds  Neurological:      General: No focal deficit present  Mental Status: She is alert and oriented to person, place, and time  Comments: No myoclonus, no rigidity, no tremor   Psychiatric:         Mood and Affect: Mood normal              EKG, Pathology, and Other Studies: Vent   rate 91 BPM MN interval 80 ms QRS duration 76 ms QT/QTc 330/405 ms no terminal R, no MASON or STD       Lab Results: I have personally reviewed pertinent reports  Labs:  Results from last 7 days   Lab Units 04/30/21  0454   WBC Thousand/uL 7 60   HEMOGLOBIN g/dL 13 5   HEMATOCRIT % 42 5   PLATELETS Thousands/uL 416*   NEUTROS PCT % 82*   LYMPHS PCT % 12*   MONOS PCT % 5      Results from last 7 days   Lab Units 04/30/21  1146   SODIUM mmol/L 137   POTASSIUM mmol/L 3 3*   CHLORIDE mmol/L 107   CO2 mmol/L 20*   BUN mg/dL 5   CREATININE mg/dL 0 81   CALCIUM mg/dL 8 6   ALK PHOS U/L 67   ALT U/L 17   AST U/L 11   MAGNESIUM mg/dL 2 4      Results from last 7 days   Lab Units 04/30/21  1146 04/30/21  0456   INR  1 26* 1 18   PTT seconds  --  27         No results found for: TROPONINI  Results from last 7 days   Lab Units 04/30/21  0454   PH STONEY  7 370   PCO2 STONEY mm Hg 39 7*   PO2 STONEY mm Hg 27 2*   HCO3 STONEY mmol/L 22 4*   O2 CONTENT STONEY ml/dL 9 6   O2 HGB, VENOUS % 47 0*     Results from last 7 days   Lab Units 04/30/21  1146 04/30/21  0724 04/30/21  0454   ACETAMINOPHEN LVL ug/mL 25*  --  111*   ETHANOL LVL mg/dL  --   --  <3   SALICYLATE LVL mg/dL  --  10 14     Invalid input(s): EXTPREGUR    Imaging Studies: I have personally reviewed pertinent reports  Counseling / Coordination of Care  Total floor / unit time spent today 60 minutes  Greater than 50% of total time was spent with the patient and / or family counseling and / or coordination of care

## 2021-04-30 NOTE — ED NOTES
[facility]  04/30/21 0828

## 2021-04-30 NOTE — ASSESSMENT & PLAN NOTE
· EKG sinus rhythm with sinus arrhythmia with   · Will avoid QTC prolonging agents, monitor on telemetry

## 2021-04-30 NOTE — ED PROVIDER NOTES
History  Chief Complaint   Patient presents with    Overdose - Intentional     pt took 10 tylenol, 10 midiol, 10 excederine, 6 zquills at 11pm to harm herself due to stress     Patient is a 41-year-old female, with a history significant for anxiety and depression, who presents to the ED today, via EMS, due to intentional overdose with suicidal intent  Patient took 10 Tylenol, 10 Excedrin, 10 Midol, 6 zquil (calculated total 12,500mg APAP as well as antihistamine and ASA components) at 11:00 p m  last evening due to stress  Patient admits to suicidal intent  She has had associated nausea and nonbloody vomiting over the last five hours  Patient also reports burning epigastric and chest pain that is associated with vomiting as well as right upper quadrant abdominal pain  Patient has been under increasing academic social and academic stress: she was cheated on by her boyfriend, her parents cut her off, and she is stressed with coursework  No clear remitting factors  Patient denies [address]  Patient denies alcohol or drug use in addition to the medications listed above  - No language barrier    - History obtained from patient  - There are no limitations to the history obtained  - Previous charting was reviewed          None       Past Medical History:   Diagnosis Date    Anxiety     Depression        History reviewed  No pertinent surgical history  History reviewed  No pertinent family history  I have reviewed and agree with the history as documented      E-Cigarette/Vaping    E-Cigarette Use Never User      E-Cigarette/Vaping Substances    Nicotine No     THC No     CBD No     Flavoring No     Other No     Unknown No      Social History     Tobacco Use    Smoking status: Never Smoker    Smokeless tobacco: Never Used   Substance Use Topics    Alcohol use: Yes     Frequency: 2-3 times a week     Drinks per session: 3 or 4    Drug use: Yes     Types: Marijuana        Review of Systems Constitutional: Negative for fever  HENT: Negative for trouble swallowing  Eyes: Negative for visual disturbance  Respiratory: Negative for shortness of breath  Cardiovascular: Positive for chest pain (/epigastric  Associated with vomiting)  Gastrointestinal: Positive for abdominal pain, nausea and vomiting  Endocrine: Negative for polyuria  Genitourinary: Negative for dysuria  Musculoskeletal: Negative for gait problem  Skin: Negative for rash  Allergic/Immunologic: Negative for environmental allergies  Neurological: Negative for weakness and numbness  Hematological: Negative for adenopathy  Psychiatric/Behavioral: Positive for suicidal ideas  Negative for confusion  Physical Exam  ED Triage Vitals   Temperature Pulse Respirations Blood Pressure SpO2   04/30/21 0455 04/30/21 0445 04/30/21 0445 04/30/21 0445 04/30/21 0445   97 9 °F (36 6 °C) 86 18 133/64 98 %      Temp Source Heart Rate Source Patient Position - Orthostatic VS BP Location FiO2 (%)   04/30/21 0455 04/30/21 0445 04/30/21 0445 04/30/21 0445 --   Oral Monitor Lying Left arm       Pain Score       --                    Orthostatic Vital Signs  Vitals:    04/30/21 0445 04/30/21 0626 04/30/21 0630   BP: 133/64 127/78 126/76   Pulse: 86 92 96   Patient Position - Orthostatic VS: Lying Lying Lying       Physical Exam  Vitals signs and nursing note reviewed  Constitutional:       General: She is not in acute distress  Appearance: She is ill-appearing  She is not toxic-appearing  HENT:      Head: Normocephalic  Right Ear: External ear normal       Left Ear: External ear normal       Nose: Nose normal  No rhinorrhea  Mouth/Throat:      Mouth: Mucous membranes are moist       Pharynx: Oropharynx is clear  No oropharyngeal exudate or posterior oropharyngeal erythema  Eyes:      General: No scleral icterus  Right eye: No discharge  Left eye: No discharge        Extraocular Movements: Extraocular movements intact  Conjunctiva/sclera: Conjunctivae normal       Pupils: Pupils are equal, round, and reactive to light  Comments: No mydriasis    Neck:      Musculoskeletal: Neck supple  No muscular tenderness  Cardiovascular:      Rate and Rhythm: Normal rate and regular rhythm  Pulses: Normal pulses  Heart sounds: Normal heart sounds  No murmur  No friction rub  No gallop  Comments: 2+ Radial  Pulmonary:      Effort: Pulmonary effort is normal  No respiratory distress  Breath sounds: Normal breath sounds  No stridor  No wheezing, rhonchi or rales  Abdominal:      General: Abdomen is flat  Bowel sounds are normal  There is no distension  Palpations: Abdomen is soft  Tenderness: There is abdominal tenderness (Right upper quadrant)  There is no right CVA tenderness, left CVA tenderness, guarding or rebound  Musculoskeletal:         General: No deformity  Lymphadenopathy:      Cervical: No cervical adenopathy  Skin:     General: Skin is warm and dry  Capillary Refill: Capillary refill takes less than 2 seconds  Coloration: Skin is not jaundiced  Neurological:      Mental Status: She is alert  Comments: AAO x3  Cranial nerves 2-12 intact  5/5 strength in all 4 extremities  Sensation to light touch in intact in all 4 extremities  Patient is speaking clearly complete sentences  Patient is answering appropriately and able follow commands  Psychiatric:      Comments: Patient is dressed appropriately in street clothes  Her thought process is linear and she denies auditory/visual hallucinations  Anxious mood and affect    Patient describes suicidal ideation/acts         ED Medications  Medications   acetylcysteine (ACETADOTE) 4,540 mg in dextrose 5 % 1,000 mL IVPB (has no administration in time range)   acetylcysteine (ACETADOTE) 2,270 mg in dextrose 5 % 500 mL IVPB (has no administration in time range)   pyridoxine (VITAMIN B6) injection 25 mg (has no administration in time range)   acetylcysteine (ACETADOTE) 6,810 mg in dextrose 5 % 200 mL IVPB (0 mg/kg × 45 4 kg Intravenous Stopped 4/30/21 0715)   ondansetron (ZOFRAN-ODT) dispersible tablet 4 mg (0 mg Oral Given to EMS 4/30/21 0445)   sodium chloride 0 9 % bolus 1,000 mL (0 mL Intravenous Stopped 4/30/21 0715)   ondansetron (ZOFRAN) injection 4 mg (4 mg Intravenous Given 4/30/21 0502)   magnesium sulfate 2 g/50 mL IVPB (premix) 2 g (0 g Intravenous Stopped 4/30/21 0715)   metoclopramide (REGLAN) injection 10 mg (10 mg Intravenous Given 4/30/21 0631)       Diagnostic Studies  Results Reviewed     Procedure Component Value Units Date/Time    Salicylate level [[id number]]     Lab Status: No result Specimen: Blood     Rapid drug screen, urine [[id number]]  (Normal) Collected: 04/30/21 0555    Lab Status: Final result Specimen: Urine, Clean Catch Updated: 04/30/21 0655     Amph/Meth UR Negative     Barbiturate Ur Negative     Benzodiazepine Urine Negative     Cocaine Urine Negative     Methadone Urine Negative     Opiate Urine Negative     PCP Ur Negative     THC Urine Negative     Oxycodone Urine Negative    Narrative:      FOR MEDICAL PURPOSES ONLY  IF CONFIRMATION NEEDED PLEASE CONTACT THE LAB WITHIN 5 DAYS  Drug Screen Cutoff Levels:  AMPHETAMINE/METHAMPHETAMINES  1000 ng/mL  BARBITURATES     200 ng/mL  BENZODIAZEPINES     200 ng/mL  COCAINE      300 ng/mL  METHADONE      300 ng/mL  OPIATES      300 ng/mL  PHENCYCLIDINE     25 ng/mL  THC       50 ng/mL  OXYCODONE      100 ng/mL    Novel Coronavirus (Covid-19),PCR [facility] [[id number]]  (Normal) Collected: 04/30/21 0454    Lab Status: Final result Specimen: Nares from Nose Updated: 04/30/21 0601     SARS-CoV-2 Negative    Narrative:       The specimen collection materials, transport medium, and/or testing methodology utilized in the production of these test results have been proven to be reliable in a limited validation with an abbreviated program under the Emergency Utilization Authorization provided by the FDA  Testing reported as "Presumptive positive" will be confirmed with secondary testing to ensure result accuracy  Clinical caution and judgement should be used with the interpretation of these results with consideration of the clinical impression and other laboratory testing  Testing reported as "Positive" or "Negative" has been proven to be accurate according to standard laboratory validation requirements  All testing is performed with control materials showing appropriate reactivity at standard intervals  Acetaminophen level-If concentration is detectable, please discuss with medical  on call   [[id number]]  (Abnormal) Collected: 04/30/21 0454    Lab Status: Final result Specimen: Blood from Arm, Right Updated: 04/30/21 0554     Acetaminophen Level 111 ug/mL     hCG, qualitative pregnancy [[id number]]  (Normal) Collected: 04/30/21 0454    Lab Status: Final result Specimen: Blood from Arm, Right Updated: 04/30/21 0548     Preg, Serum Negative    Salicylate level [[id number]]  (Normal) Collected: 04/30/21 0454    Lab Status: Final result Specimen: Blood from Arm, Right Updated: 21/12/19 6880     Salicylate Lvl 14 mg/dL     Comprehensive metabolic panel [[id number]]  (Abnormal) Collected: 04/30/21 0454    Lab Status: Final result Specimen: Blood from Arm, Right Updated: 04/30/21 0540     Sodium 140 mmol/L      Potassium 3 5 mmol/L      Chloride 107 mmol/L      CO2 22 mmol/L      ANION GAP 11 mmol/L      BUN 9 mg/dL      Creatinine 0 96 mg/dL      Glucose 151 mg/dL      Calcium 8 8 mg/dL      AST 14 U/L      ALT 17 U/L      Alkaline Phosphatase 81 U/L      Total Protein 8 3 g/dL      Albumin 4 5 g/dL      Total Bilirubin 0 39 mg/dL      eGFR 85 ml/min/1 73sq m     Narrative:      [place] guidelines for Chronic Kidney Disease (CKD):     Stage 1 with normal or high GFR (GFR > 90 mL/min/1 73 square meters)    Stage 2 Mild CKD (GFR = 60-89 mL/min/1 73 square meters)    Stage 3A Moderate CKD (GFR = 45-59 mL/min/1 73 square meters)    Stage 3B Moderate CKD (GFR = 30-44 mL/min/1 73 square meters)    Stage 4 Severe CKD (GFR = 15-29 mL/min/1 73 square meters)    Stage 5 End Stage CKD (GFR <15 mL/min/1 73 square meters)  Note: GFR calculation is accurate only with a steady state creatinine    Ethanol [[id number]]  (Normal) Collected: 04/30/21 0454    Lab Status: Final result Specimen: Blood from Arm, Right Updated: 04/30/21 0534     Ethanol Lvl <3 mg/dL     CBC and differential [[id number]]  (Abnormal) Collected: 04/30/21 0454    Lab Status: Final result Specimen: Blood from Arm, Right Updated: 04/30/21 0527     WBC 7 60 Thousand/uL      RBC 5 32 Million/uL      Hemoglobin 13 5 g/dL      Hematocrit 42 5 %      MCV 80 fL      MCH 25 4 pg      MCHC 31 8 g/dL      RDW 13 9 %      MPV 10 0 fL      Platelets 896 Thousands/uL      nRBC 0 /100 WBCs      Neutrophils Relative 82 %      Immat GRANS % 1 %      Lymphocytes Relative 12 %      Monocytes Relative 5 %      Eosinophils Relative 0 %      Basophils Relative 0 %      Neutrophils Absolute 6 28 Thousands/µL      Immature Grans Absolute 0 04 Thousand/uL      Lymphocytes Absolute 0 91 Thousands/µL      Monocytes Absolute 0 35 Thousand/µL      Eosinophils Absolute 0 00 Thousand/µL      Basophils Absolute 0 02 Thousands/µL     Protime-INR [[id number]]  (Abnormal) Collected: 04/30/21 0456    Lab Status: Final result Specimen: Blood from Arm, Right Updated: 04/30/21 0523     Protime 15 0 seconds      INR 1 18    APTT [[id number]]  (Normal) Collected: 04/30/21 0456    Lab Status: Final result Specimen: Blood from Arm, Right Updated: 04/30/21 0523     PTT 27 seconds     Blood gas, venous [[id number]]  (Abnormal) Collected: 04/30/21 0454    Lab Status: Final result Specimen: Blood from Arm, Right Updated: 04/30/21 0512     pH, Ponce 7 370     pCO2, Ponce 39 7 mm Hg      pO2, Ponce 27 2 mm Hg      HCO3, Ponce 22 4 mmol/L      Base Excess, Ponce -2 6 mmol/L      O2 Content, Ponce 9 6 ml/dL      O2 HGB, VENOUS 47 0 %     POCT alcohol breath test [[id number]]  (Normal) Resulted: 04/30/21 0503    Lab Status: Final result Updated: 04/30/21 0503     EXTBreath Alcohol 0 00                 No orders to display         Procedures  Procedures      ED Course  ED Course as of Apr 30 0723 Fri Apr 30, 2021   0517 EKG: Normal rate, Regular rhythm  Normal axis, no ectopy, prolonged Qtc (549), normal QRS, No ST elevations/depressions      0524 INR: 1 18   0535 MEDICAL ALCOHOL: <3   0540 AST: 14   0541 ALT: 17   0541 Alkaline Phosphatase: 81   0541 Sodium: 140   0541 Chloride: 107   0541 No anion gap   CO2: 22   2320 SALICYLATE LEVEL: 14   0555 [facility] patient is right the line for toxicity  Will maintain treatment with NAc   ACETAMINOPHEN LEVEL(!!): 111   0557 PREGNANCY, SERUM: Negative   0601 SARS-COV-2: Negative   0616 MELD Score 8      0625 Patient has continues emesis  Will provide metoclopramide and B6      0723 Patient is admitted to [facility]  Number of Diagnoses or Management Options  Intentional drug overdose, initial encounter [facility]):   Prolonged Q-T interval on ECG:   Suicide attempt [facility]):   Diagnosis management comments: Patient is a 78-year-old female, with a history significant for anxiety and depression, who presents to the ED today, via EMS, due to intentional overdose with suicidal intent  Patient took 10 Tylenol, 10 Excedrin, 10 Midol, 6 zquil (calculated total 12,500mg APAP as well as antihistamine and ASA components) at 11:00 p m  last evening due to stress  Patient admits to suicidal intent  She has had associated nausea and nonbloody vomiting over the last five hours  Patient also reports burning epigastric and chest pain that is associated with vomiting as well as right upper quadrant abdominal pain    Patient is currently afebrile hemodynamically stable  Her physical exam is notable for persistent vomiting as well as right upper quadrant tenderness palpation with neither guarding nor rebound  This presentation is concerning for:  APAP overdose, ASA overdose, antihistamine overdose, suicide attempt  Will investigate with ECG, CBC, CMP, coma panel, pregnancy test, coronavirus testing, UDS, INR/aptt, VBG  Will manage with N-acetylcysteine, magnesium, fluids, Zofran  Disposition  Final diagnoses:   Intentional drug overdose, initial encounter ([place] [address] )   Suicide attempt ([place] [address] )   Prolonged Q-T interval on ECG     Time reflects when diagnosis was documented in both MDM as applicable and the Disposition within this note     Time User Action Codes Description Comment    4/30/2021  5:19 AM Gabriele Burger A Add [T50 902A] Intentional drug overdose, initial encounter ([place] [address] )     4/30/2021  5:19 AM Gabriele Burger A Add [T14 91XA] Suicide attempt ([place] [address] )     4/30/2021  5:19 AM Gabriele Burger A Add [R94 31] Prolonged Q-T interval on ECG       ED Disposition     ED Disposition Condition Date/Time Comment    Admit Stable Fri Apr 30, 2021  7:05 AM Case was discussed with ZOE and the patient's admission status was agreed to be Admission Status: inpatient status to the service of Dr Graciela Mcdermott   Follow-up Information    None         Patient's Medications    No medications on file     No discharge procedures on file  PDMP Review       Value Time User    PDMP Reviewed  Yes 4/30/2021  7:13 AM Kody Duncan DO           ED Provider  Attending physically available and evaluated Alejandro Fish  I managed the patient along with the ED Attending      Electronically Signed by         Waldo Finley MD  04/30/21 7506

## 2021-04-30 NOTE — ED NOTES
Pt reports having issues with parents, college classes, and relationship issues that triggered her suicidal thoughts      Mary Castaneda RN  04/30/21 2637

## 2021-04-30 NOTE — PROGRESS NOTES
[facility]  Progress Note Zuleyka Lamas 2000, 23 y o  female MRN: [id number]  Unit/Bed#: ED 19 Encounter: [id number]  Primary Care Provider: Roxana Haji   Date and time admitted to hospital: 4/30/2021  4:36 AM    Post Admit note     * Tylenol overdose, intentional self-harm, initial encounter [facility])  Assessment & Plan  · Presented after intentional polydrug drug overdose with self-harm intent as below, noted with Tylenol level 111 and additionally salicylate level 14; LFTs and INR WNL at time of admission and UDS negative  · Started on an NAC in ED and will continue with toxicology consultation  · IVF, Isolyte at 150 mL/hr   · Monitor serial CMP, INR  Next check 1200   · Telemetry monitoring  · Maintain 1:1 observation  · Psychiatry consultation    Prolonged QT interval  Assessment & Plan  · EKG sinus rhythm with sinus arrhythmia with   · Will avoid QTC prolonging agents, monitor on telemetry    Nausea  Assessment & Plan  Avoid Zofran as QTc prolonged  · Trial Pepcid 20 mg IV daily     Hypokalemia  Assessment & Plan  K+ 3 5  · Replete with KDUR and monitor       Patient found to be resting in bed on room air  Reports feeling nauseous  Denies HA, dizziness, CP, SOB, vomiting or diarrhea  Reports an episode of vomiting prior to admission  States she is undergoing a lot of stress: failing college, parents , parents emotionally and financially cutting her off, and a breakup after being cheated on by her boyfriend  States she intentionally tried to harm herself due to these stressors  She overdosed in her college dorm room when her roommate was back at home  Patient started vomiting and feeling ill when she decided to call EMS for medical attention  Patient was taken to [facility]  Will continue current treatment plan and follow-up toxicology recommendations

## 2021-04-30 NOTE — ED NOTES
Pt reports she is getting little red patches on her face  Dr Hilton Onofre advised        Raul Sweeney, RN  04/30/21 1297

## 2021-04-30 NOTE — CONSULTS
Consultation - [address] 23 y o  female MRN: [id number]  Unit/Bed#: ED 23 Encounter: [id number]      Chief Complaint:  Patient is very depressed    History of Present Illness   Physician Requesting Consult: Renee Puckett MD  Reason for Consult / Principal Problem:  Decide attempt, intentional drug overdose initial encounter    Dilia Broderick is a 23 y o  female  with depression presents with an intentional drug overdose of  extra strength Tylenol, Excedrin, Midol and Zzzyquil with intention to hurt herself  She states that she has a history of depression but she was not taking any medication as she is not in therapy  Her stressors are was her parents cut her off, her boyfriend cheated on her  She states that she had been filling  depressed for a long time, she has anhedonia, she has poor concentration, she has a sleep difficulties, poor appetite, she has no motivation and for that reason she have been failing her classes  She states that her parents  on October  Patient continued to feel very depressed, and she still danger to herself  She does not have any psychotic symptom or manic episodes  Psychiatric Review Of Systems:  sleep: yes  appetite changes: yes  weight changes: no  energy/anergy: yes  interest/pleasure/anhedonia: yes  somatic symptoms: no  anxiety/panic: yes  nika: no  guilty/hopeless: yes  self injurious behavior/risky behavior: yes    Historical Information   Past Psychiatric History:   None  Currently in treatment with none  Past Suicide attempts:  None  Past Violent behavior:  None  Past Psychiatric medication trial:  Concerta    Substance Abuse History:  She smoked marijuana daily, social alcohol, denies any other drug    I have assessed this patient for substance use within the past 12 months     History of IP/OP rehabilitation program:  None  Smoking history:  None  Family Psychiatric History:    Mother is bipolar, she denies any history of mental illness or substance abuse    Social History  Education: student In college  Learning Disabilities: None  Marital history: single  Living arrangement, social support: She live in the dorm with a roommate  Occupational History:  Part-time as a  and college student  Functioning Relationships: poor support system    Other Pertinent History: no legal or  history    Traumatic History:   Abuse: None  Other Traumatic Events: none    Past Medical History:   Diagnosis Date    Anxiety     Depression        Medical Review Of Systems:  Review of Systems - Negative except severe depression, anhedonia, poor sleep, poor appetite, nausea, all other systems reviewed were negative    Meds/Allergies   current meds:   Current Facility-Administered Medications   Medication Dose Route Frequency    acetylcysteine (ACETADOTE) 4,540 mg in dextrose 5 % 1,000 mL IVPB  100 mg/kg Intravenous Once    famotidine (PEPCID) injection 20 mg  20 mg Intravenous Q24H [facility]    multi-electrolyte (PLASMALYTE-A/ISOLYTE-S PH 7 4) IV solution  150 mL/hr Intravenous Continuous     Allergies   Allergen Reactions    Seasonal Ic [Cholestatin] Eye Swelling       Objective   Vital signs in last 24 hours:  Temp:  [97 9 °F (36 6 °C)] 97 9 °F (36 6 °C)  HR:  [70-96] 80  Resp:  [16-18] 16  BP: (105-140)/(60-81) 125/81      Intake/Output Summary (Last 24 hours) at 4/30/2021 1128  Last data filed at 4/30/2021 0715  Gross per 24 hour   Intake 1250 ml   Output --   Net 1250 ml       Mental Status Evaluation:  Appearance:  age appropriate   Behavior:  Cooperative   Speech:  soft   Mood:  anxious and depressed   Affect:  constricted   Language: naming objects and repeating phrases   Thought Process:  goal directed   Associations: intact associations   Thought Content:  normal   Perceptual Disturbances: None   Risk Potential: Status post overdose in a suicidal attempt, no homicidal ideation   Sensorium:  person, place, time/date and situation Memory:  recent and remote memory grossly intact   Cognition:  recent and remote memory grossly intact   Consciousness:  alert and awake    Attention: attention span and concentration were age appropriate   Intellect: within normal limits   Fund of Knowledge: awareness of current events: Good, past history: Good and vocabulary: Good   Insight:  limited   Judgment: limited   Muscle Strength and Tone: Within normal limits   Gait/Station: Unable to assess patient is in bed   Motor Activity: no abnormal movements     Lab Results:    I have personally reviewed all pertinent laboratory/tests results  Labs in last 72 hours:   Recent Labs     04/30/21  0454   WBC 7 60   RBC 5 32*   HGB 13 5   HCT 42 5   *   RDW 13 9   NEUTROABS 6 28   SODIUM 140   K 3 5      CO2 22   BUN 9   CREATININE 0 96   GLUC 151*   CALCIUM 8 8   AST 14   ALT 17   ALKPHOS 81   TP 8 3*   ALB 4 5   TBILI 0 39   PREGSERUM Negative     COVID19:   Lab Results   Component Value Date    SARSCOV2 Negative 04/30/2021     Pregnancy:   Lab Results   Component Value Date    PREGSERUM Negative 04/30/2021     Drug Screen:   Lab Results   Component Value Date    AMPMETHUR Negative 04/30/2021    BARBTUR Negative 04/30/2021    BDZUR Negative 04/30/2021    THCUR Negative 04/30/2021    COCAINEUR Negative 04/30/2021    METHADONEUR Negative 04/30/2021    OPIATEUR Negative 04/30/2021    PCPUR Negative 04/30/2021     Acetaminophen/Salicylate level   Lab Results   Component Value Date    ACTMNPHEN 111 ([facility]) 65/95/8303    SALICYLATE 10 32/23/5634     Medical alcohol level   Lab Results   Component Value Date    ETOH <3 04/30/2021       Code Status: )Level 1 - Full Code    Assessment/Plan     Farida Ellsworth is a 23 y o  female with depression and anxiety who presented to the hospital with an overdose of multiple medication in a in a suicidal attempt    She had been feeling depressed for some time already, she have poor concentration, no motivation, poor sleep, poor appetite, anhedonia and tried to end her life  She denies any psychotic symptoms or manic episode  Diagnosis:  Major depressive disorder single episode severe without psychotic features  Plan:   Continue medical management  Continue one-to-one observation  Inpatient psych admission medically cleared and bed available patient is a 201    Risks, benefits and possible side effects of Medications:   Risks, benefits, and possible side effects of medications explained to patient and patient verbalizes understanding             Willie Phelps MD

## 2021-04-30 NOTE — H&P
[facility]  H&P- [address] 2000, 23 y o  female MRN: [id number]  Unit/Bed#: ED 19 Encounter: [id number]  Primary Care Provider: Patricio Geronimo   Date and time admitted to hospital: 4/30/2021  4:36 AM    * Tylenol overdose, intentional self-harm, initial encounter [facility])  Assessment & Plan  · Presented after intentional polydrug drug overdose with self-harm intent as below, noted with Tylenol level 111 and additionally salicylate level 14; LFTs and INR WNL at time of admission and UDS negative  · Started on an NAC in ED and will continue with toxicology consultation  · Monitor serial CMP, INR  · Telemetry monitoring  · Maintain 1:1 observation  · Psychiatry consultation      Prolonged QT interval  Assessment & Plan  · EKG sinus rhythm with sinus arrhythmia with   · Will avoid QTC prolonging agents, monitor on telemetry      VTE Prophylaxis: Pharmacologic VTE Prophylaxis contraindicated due to Low risk  / reason for no mechanical VTE prophylaxis Low risk, ambulation encouraged   Code Status:  Full code  POLST: POLST form is not discussed and not completed at this time  Anticipated Length of Stay:  Patient will be admitted on an Inpatient basis with an anticipated length of stay of  greater than 2 midnights  Justification for Hospital Stay: Please see detailed plans noted above  Chief Complaint:     Intentional overdose  History of Present Illness:  [address] is a 23 y o  female who presented after intentional drug overdose  The patient reported taking approximately 10 extra-strength Tylenol, 10 Excedrin, 10 Midol, and 6 Zzzquil tablets approximately 2300H this evening, and several hours later with development of epigastric abdominal pain with associated nausea and vomiting    She stated that the ingestion was intentional with attempt to harm herself, and states stressors in life including difficulty with course work, boyfriend cheated on her, and apparently being cut off by parents  She apparently states history of depression but states she is not currently on psychiatric medications; denies any prior suicide attempts or psychiatric hospitalizations  Denies any auditory or visual hallucinations, and denies any use of illicit drugs/alcohol/other medications save for those previously mentioned  During ED evaluation she was found with Tylenol level 160 with salicylic level 14, additionally with prolonged QTC to 549ms  She was started on NAC and per ED physician toxicology service was made aware of patient  She is subsequently admitted for further management of intentional drug overdose with Tylenol toxicity  Review of Systems:    Constitutional:  Denies fever or chills   Eyes:  Denies change in visual acuity   HENT:  Denies nasal congestion or sore throat   Respiratory:  Denies cough or shortness of breath   Cardiovascular:  Denies chest pain or edema   GI:  Denies  bloody stools or diarrhea but endorses abdominal pain, nausea, vomiting  :  Denies dysuria   Musculoskeletal:  Denies back pain or joint pain   Integument:  Denies rash   Neurologic:  Denies headache, focal weakness or sensory changes   Endocrine:  Denies polyuria or polydipsia   Lymphatic:  Denies swollen glands   Psychiatric:  Denies anxiety but endorses depression and suicidal ideas    Past Medical and Surgical History:   Past Medical History:   Diagnosis Date    Anxiety     Depression      History reviewed  No pertinent surgical history      Meds/Allergies:    Current Facility-Administered Medications:     acetylcysteine (ACETADOTE) 2,270 mg in dextrose 5 % 500 mL IVPB, 50 mg/kg, Intravenous, Once, Simon Mosqueda MD    acetylcysteine (ACETADOTE) 4,540 mg in dextrose 5 % 1,000 mL IVPB, 100 mg/kg, Intravenous, Once, Simon Mosqueda MD    pyridoxine (VITAMIN B6) injection 25 mg, 25 mg, Intravenous, Once, Simon Mosqueda MD  No current outpatient medications on file     Allergies: Allergies   Allergen Reactions    Seasonal Ic [Cholestatin] Eye Swelling     History:  Marital Status: Single     Substance Use History:   Social History     Substance and Sexual Activity   Alcohol Use Yes    Frequency: 2-3 times a week    Drinks per session: 3 or 4     Social History     Tobacco Use   Smoking Status Never Smoker   Smokeless Tobacco Never Used     Social History     Substance and Sexual Activity   Drug Use Yes    Types: Marijuana       Family History:  Noncontributory  Physical Exam:     Vitals:   Blood Pressure: 126/76 (04/30/21 0630)  Pulse: 96 (04/30/21 0630)  Temperature: 97 9 °F (36 6 °C) (04/30/21 0455)  Temp Source: Oral (04/30/21 0455)  Respirations: 18 (04/30/21 0630)  Height: 5' 2" (157 5 cm) (04/30/21 0503)  Weight - Scale: 46 3 kg (102 lb) (04/30/21 0503)  SpO2: 98 % (04/30/21 0630)    Constitutional:  Well developed, well nourished, no acute distress but uncomfortable appearing, non-toxic appearance   Eyes:  PERRL, conjunctiva normal   HENT:  Atraumatic, external ears normal, nose normal, oropharynx moist, no pharyngeal exudates  Neck- normal range of motion, no tenderness, supple   Respiratory:  No respiratory distress, normal breath sounds, no rales, no wheezing   Cardiovascular:  Normal rate, normal rhythm, no murmurs, no gallops, no rubs   GI:  Soft, nondistended, normal bowel sounds, nontender, no organomegaly, no mass, no rebound, no guarding   :  No costovertebral angle tenderness   Musculoskeletal:  No edema, no tenderness, no deformities  Back- no tenderness  Integument:  Well hydrated, no rash   Lymphatic:  No lymphadenopathy noted   Neurologic:  Alert &awake, communicative, CN 2-12 normal, normal motor function, normal sensory function, no focal deficits noted   Psychiatric:  Speech and behavior guarded, at times anxious appearing, does endorse suicidal act      Lab Results: I have personally reviewed pertinent reports        Results from last 7 days   Lab Units 04/30/21  0454   WBC Thousand/uL 7 60   HEMOGLOBIN g/dL 13 5   HEMATOCRIT % 42 5   PLATELETS Thousands/uL 416*   NEUTROS PCT % 82*   LYMPHS PCT % 12*   MONOS PCT % 5   EOS PCT % 0     Results from last 7 days   Lab Units 04/30/21  0454   POTASSIUM mmol/L 3 5   CHLORIDE mmol/L 107   CO2 mmol/L 22   BUN mg/dL 9   CREATININE mg/dL 0 96   CALCIUM mg/dL 8 8   ALK PHOS U/L 81   ALT U/L 17   AST U/L 14     Results from last 7 days   Lab Units 04/30/21  0456   INR  1 18       EKG:  Sinus rhythm with sinus arrhythmia, prolonged QTC 549ms      ** Please Note: Dragon 360 Dictation voice to text software was used in the creation of this document   **

## 2021-04-30 NOTE — LETTER
[facility]  [address] [place] [address]  Dept: [phone]    May 3, 2021     Patient: Khushboo Garcia   YOB: 2000   Date of Visit: 4/30/2021       To Whom it May Concern:    Khushboo Garcia is under my professional care  She was seen in the hospital from 4/30/2021   to 05/03/21  If you have any questions or concerns, please don't hesitate to call           Sincerely,          Soy Lugo MD

## 2021-04-30 NOTE — QUICK NOTE
In terms of acetaminophen, q 6 CMP, INR, APAP should be obtained  N-acetylcysteine can be discontinued when LFTs are downtrending x 2/stable x2, INR < 2, and APAP < 10     Please repeat EKG to reassess QTc prolongation  Goal QTc < 500  Please continue to give magnesium mm optimize electrolytes until goal has been met  Full consult note to follow

## 2021-04-30 NOTE — ED NOTES
Pt belongings: one black bra, one gray sweatshirt, one black long sleeves, one pair of slippers, one maroon bag with cards, lighter and coins       Neha Booker  04/30/21 3179

## 2021-04-30 NOTE — PLAN OF CARE
Problem: PAIN - ADULT  Goal: Verbalizes/displays adequate comfort level or baseline comfort level  Description: Interventions:  - Encourage patient to monitor pain and request assistance  - Assess pain using appropriate pain scale  - Administer analgesics based on type and severity of pain and evaluate response  - Implement non-pharmacological measures as appropriate and evaluate response  - Consider cultural and social influences on pain and pain management  - Notify physician/advanced practitioner if interventions unsuccessful or patient reports new pain  Outcome: Progressing     Problem: INFECTION - ADULT  Goal: Absence or prevention of progression during hospitalization  Description: INTERVENTIONS:  - Assess and monitor for signs and symptoms of infection  - Monitor lab/diagnostic results  - Monitor all insertion sites, i e  indwelling lines, tubes, and drains  - Monitor endotracheal if appropriate and nasal secretions for changes in amount and color  - [place] appropriate cooling/warming therapies per order  - Administer medications as ordered  - Instruct and encourage patient and family to use good hand hygiene technique  - Identify and instruct in appropriate isolation precautions for identified infection/condition  Outcome: Progressing  Goal: Absence of fever/infection during neutropenic period  Description: INTERVENTIONS:  - Monitor WBC    Outcome: Progressing     Problem: SAFETY ADULT  Goal: Patient will remain free of falls  Description: INTERVENTIONS:  - Assess patient frequently for physical needs  -  Identify cognitive and physical deficits and behaviors that affect risk of falls    -  [place] fall precautions as indicated by assessment   - Educate patient/family on patient safety including physical limitations  - Instruct patient to call for assistance with activity based on assessment  - Modify environment to reduce risk of injury  - Consider OT/PT consult to assist with strengthening/mobility  Outcome: Progressing  Goal: Maintain or return to baseline ADL function  Description: INTERVENTIONS:  -  Assess patient's ability to carry out ADLs; assess patient's baseline for ADL function and identify physical deficits which impact ability to perform ADLs (bathing, care of mouth/teeth, toileting, grooming, dressing, etc )  - Assess/evaluate cause of self-care deficits   - Assess range of motion  - Assess patient's mobility; develop plan if impaired  - Assess patient's need for assistive devices and provide as appropriate  - Encourage maximum independence but intervene and supervise when necessary  - Involve family in performance of ADLs  - Assess for home care needs following discharge   - Consider OT consult to assist with ADL evaluation and planning for discharge  - Provide patient education as appropriate  Outcome: Progressing  Goal: Maintain or return mobility status to optimal level  Description: INTERVENTIONS:  - Assess patient's baseline mobility status (ambulation, transfers, stairs, etc )    - Identify cognitive and physical deficits and behaviors that affect mobility  - Identify mobility aids required to assist with transfers and/or ambulation (gait belt, sit-to-stand, lift, walker, cane, etc )  - [place] fall precautions as indicated by assessment  - Record patient progress and toleration of activity level on Mobility SBAR; progress patient to next Phase/Stage  - Instruct patient to call for assistance with activity based on assessment  - Consider rehabilitation consult to assist with strengthening/weightbearing, etc   Outcome: Progressing     Problem: DISCHARGE PLANNING  Goal: Discharge to home or other facility with appropriate resources  Description: INTERVENTIONS:  - Identify barriers to discharge w/patient and caregiver  - Arrange for needed discharge resources and transportation as appropriate  - Identify discharge learning needs (meds, wound care, etc )  - Arrange for interpretive services to assist at discharge as needed  - Refer to Case Management Department for coordinating discharge planning if the patient needs post-hospital services based on physician/advanced practitioner order or complex needs related to functional status, cognitive ability, or social support system  Outcome: Progressing     Problem: Knowledge Deficit  Goal: Patient/family/caregiver demonstrates understanding of disease process, treatment plan, medications, and discharge instructions  Description: Complete learning assessment and assess knowledge base    Interventions:  - Provide teaching at level of understanding  - Provide teaching via preferred learning methods  Outcome: Progressing     Problem: CARDIOVASCULAR - ADULT  Goal: Maintains optimal cardiac output and hemodynamic stability  Description: INTERVENTIONS:  - Monitor I/O, vital signs and rhythm  - Monitor for S/S and trends of decreased cardiac output  - Administer and titrate ordered vasoactive medications to optimize hemodynamic stability  - Assess quality of pulses, skin color and temperature  - Assess for signs of decreased coronary artery perfusion  - Instruct patient to report change in severity of symptoms  Outcome: Progressing  Goal: Absence of cardiac dysrhythmias or at baseline rhythm  Description: INTERVENTIONS:  - Continuous cardiac monitoring, vital signs, obtain 12 lead EKG if ordered  - Administer antiarrhythmic and heart rate control medications as ordered  - Monitor electrolytes and administer replacement therapy as ordered  Outcome: Progressing     Problem: GASTROINTESTINAL - ADULT  Goal: Minimal or absence of nausea and/or vomiting  Description: INTERVENTIONS:  - Administer IV fluids if ordered to ensure adequate hydration  - Maintain NPO status until nausea and vomiting are resolved  - Nasogastric tube if ordered  - Administer ordered antiemetic medications as needed  - Provide nonpharmacologic comfort measures as appropriate  - Advance diet as tolerated, if ordered  - Consider nutrition services referral to assist patient with adequate nutrition and appropriate food choices  Outcome: Progressing  Goal: Maintains adequate nutritional intake  Description: INTERVENTIONS:  - Monitor percentage of each meal consumed  - Identify factors contributing to decreased intake, treat as appropriate  - Assist with meals as needed  - Monitor I&O, weight, and lab values if indicated  - Obtain nutrition services referral as needed  Outcome: Progressing     Problem: METABOLIC, FLUID AND ELECTROLYTES - ADULT  Goal: Electrolytes maintained within normal limits  Description: INTERVENTIONS:  - Monitor labs and assess patient for signs and symptoms of electrolyte imbalances  - Administer electrolyte replacement as ordered  - Monitor response to electrolyte replacements, including repeat lab results as appropriate  - Instruct patient on fluid and nutrition as appropriate  Outcome: Progressing  Goal: Fluid balance maintained  Description: INTERVENTIONS:  - Monitor labs   - Monitor I/O and WT  - Instruct patient on fluid and nutrition as appropriate  - Assess for signs & symptoms of volume excess or deficit  Outcome: Progressing  Goal: Glucose maintained within target range  Description: INTERVENTIONS:  - Monitor Blood Glucose as ordered  - Assess for signs and symptoms of hyperglycemia and hypoglycemia  - Administer ordered medications to maintain glucose within target range  - Assess nutritional intake and initiate nutrition service referral as needed  Outcome: Progressing     Problem: COPING  Goal: Pt/Family able to verbalize concerns and demonstrate effective coping strategies  Description: INTERVENTIONS:  - Assist patient/family to identify coping skills, available support systems and cultural and spiritual values  - Provide emotional support, including active listening and acknowledgement of concerns of patient and caregivers  - Reduce environmental stimuli, as able  - Provide patient education  - Assess for spiritual pain/suffering and initiate spiritual care, including notification of Pastoral Care or mani based community as needed  - Assess effectiveness of coping strategies  Outcome: Progressing  Goal: Will report anxiety at manageable levels  Description: INTERVENTIONS:  - Administer medication as ordered  - Teach and encourage coping skills  - Provide emotional support  - Assess patient/family for anxiety and ability to cope  Outcome: Progressing     Problem: SELF HARM  Goal: Effect of psychiatric condition will be minimized and patient will be protected from self harm  Description: INTERVENTIONS:  - Assess impact of patient's symptoms on level of functioning, self-care needs and offer support as indicated  - Assess patient/family knowledge of depression, impact on illness and need for teaching  - Provide emotional support, presence and reassurance  - Assess for possible suicidal thoughts, ideation or self-harm  If patient expresses suicidal thoughts or statements do not leave alone, notify physician/AP immediately, initiate suicide precautions, and determine need for continual observation    - initiate consults and referrals as appropriate (a mental health professional, Spiritual Care  Outcome: Progressing

## 2021-04-30 NOTE — ASSESSMENT & PLAN NOTE
· Presented after intentional polydrug drug overdose with self-harm intent as below, noted with Tylenol level 111 and additionally salicylate level 14;  LFTs and INR WNL at time of admission and UDS negative  · Started on an NAC in ED and will continue with toxicology consultation  · Monitor serial CMP, INR  · Telemetry monitoring  · Maintain 1:1 observation  · Psychiatry consultation

## 2021-04-30 NOTE — ASSESSMENT & PLAN NOTE
· Presented after intentional polydrug drug overdose with self-harm intent as below, noted with Tylenol level 111 and additionally salicylate level 14; LFTs and INR WNL at time of admission and UDS negative  · Started on an NAC in ED and will continue with toxicology consultation  · IVF, Isolyte at 150 mL/hr   · Monitor serial CMP, INR   Next check 1200   · Telemetry monitoring  · Maintain 1:1 observation  · Psychiatry consultation

## 2021-05-01 PROBLEM — F32.9 MAJOR DEPRESSIVE DISORDER WITH SINGLE EPISODE: Status: ACTIVE | Noted: 2021-05-01

## 2021-05-01 LAB
ALBUMIN SERPL BCP-MCNC: 3.7 G/DL (ref 3.5–5)
ALBUMIN SERPL BCP-MCNC: 4 G/DL (ref 3.5–5)
ALP SERPL-CCNC: 65 U/L (ref 46–116)
ALP SERPL-CCNC: 69 U/L (ref 46–116)
ALT SERPL W P-5'-P-CCNC: 16 U/L (ref 12–78)
ALT SERPL W P-5'-P-CCNC: 19 U/L (ref 12–78)
ANION GAP SERPL CALCULATED.3IONS-SCNC: 10 MMOL/L (ref 4–13)
ANION GAP SERPL CALCULATED.3IONS-SCNC: 7 MMOL/L (ref 4–13)
APAP SERPL-MCNC: <2 UG/ML (ref 10–20)
APAP SERPL-MCNC: <2 UG/ML (ref 10–20)
AST SERPL W P-5'-P-CCNC: 11 U/L (ref 5–45)
AST SERPL W P-5'-P-CCNC: 13 U/L (ref 5–45)
ATRIAL RATE: 79 BPM
BASOPHILS # BLD AUTO: 0.04 THOUSANDS/ΜL (ref 0–0.1)
BASOPHILS NFR BLD AUTO: 1 % (ref 0–1)
BILIRUB SERPL-MCNC: 0.55 MG/DL (ref 0.2–1)
BILIRUB SERPL-MCNC: 0.75 MG/DL (ref 0.2–1)
BUN SERPL-MCNC: 3 MG/DL (ref 5–25)
BUN SERPL-MCNC: 4 MG/DL (ref 5–25)
CALCIUM SERPL-MCNC: 8.8 MG/DL (ref 8.3–10.1)
CALCIUM SERPL-MCNC: 8.9 MG/DL (ref 8.3–10.1)
CHLORIDE SERPL-SCNC: 109 MMOL/L (ref 100–108)
CHLORIDE SERPL-SCNC: 111 MMOL/L (ref 100–108)
CO2 SERPL-SCNC: 20 MMOL/L (ref 21–32)
CO2 SERPL-SCNC: 22 MMOL/L (ref 21–32)
CREAT SERPL-MCNC: 0.76 MG/DL (ref 0.6–1.3)
CREAT SERPL-MCNC: 0.81 MG/DL (ref 0.6–1.3)
EOSINOPHIL # BLD AUTO: 0.08 THOUSAND/ΜL (ref 0–0.61)
EOSINOPHIL NFR BLD AUTO: 1 % (ref 0–6)
ERYTHROCYTE [DISTWIDTH] IN BLOOD BY AUTOMATED COUNT: 14.3 % (ref 11.6–15.1)
GFR SERPL CREATININE-BSD FRML MDRD: 104 ML/MIN/1.73SQ M
GFR SERPL CREATININE-BSD FRML MDRD: 112 ML/MIN/1.73SQ M
GLUCOSE SERPL-MCNC: 71 MG/DL (ref 65–140)
GLUCOSE SERPL-MCNC: 85 MG/DL (ref 65–140)
HCT VFR BLD AUTO: 40.4 % (ref 34.8–46.1)
HGB BLD-MCNC: 12.8 G/DL (ref 11.5–15.4)
IMM GRANULOCYTES # BLD AUTO: 0.02 THOUSAND/UL (ref 0–0.2)
IMM GRANULOCYTES NFR BLD AUTO: 0 % (ref 0–2)
INR PPP: 1.3 (ref 0.84–1.19)
INR PPP: 1.35 (ref 0.84–1.19)
LYMPHOCYTES # BLD AUTO: 3.04 THOUSANDS/ΜL (ref 0.6–4.47)
LYMPHOCYTES NFR BLD AUTO: 41 % (ref 14–44)
MAGNESIUM SERPL-MCNC: 2.4 MG/DL (ref 1.6–2.6)
MCH RBC QN AUTO: 25.5 PG (ref 26.8–34.3)
MCHC RBC AUTO-ENTMCNC: 31.7 G/DL (ref 31.4–37.4)
MCV RBC AUTO: 81 FL (ref 82–98)
MONOCYTES # BLD AUTO: 0.61 THOUSAND/ΜL (ref 0.17–1.22)
MONOCYTES NFR BLD AUTO: 8 % (ref 4–12)
NEUTROPHILS # BLD AUTO: 3.64 THOUSANDS/ΜL (ref 1.85–7.62)
NEUTS SEG NFR BLD AUTO: 49 % (ref 43–75)
NRBC BLD AUTO-RTO: 0 /100 WBCS
P AXIS: 29 DEGREES
PHOSPHATE SERPL-MCNC: 2 MG/DL (ref 2.7–4.5)
PLATELET # BLD AUTO: 385 THOUSANDS/UL (ref 149–390)
PMV BLD AUTO: 10.5 FL (ref 8.9–12.7)
POTASSIUM SERPL-SCNC: 3.8 MMOL/L (ref 3.5–5.3)
POTASSIUM SERPL-SCNC: 3.9 MMOL/L (ref 3.5–5.3)
PR INTERVAL: 80 MS
PROT SERPL-MCNC: 7.1 G/DL (ref 6.4–8.2)
PROT SERPL-MCNC: 7.4 G/DL (ref 6.4–8.2)
PROTHROMBIN TIME: 16.2 SECONDS (ref 11.6–14.5)
PROTHROMBIN TIME: 16.6 SECONDS (ref 11.6–14.5)
QRS AXIS: 48 DEGREES
QRSD INTERVAL: 84 MS
QT INTERVAL: 368 MS
QTC INTERVAL: 421 MS
RBC # BLD AUTO: 5.01 MILLION/UL (ref 3.81–5.12)
SODIUM SERPL-SCNC: 139 MMOL/L (ref 136–145)
SODIUM SERPL-SCNC: 140 MMOL/L (ref 136–145)
T WAVE AXIS: 13 DEGREES
VENTRICULAR RATE: 79 BPM
WBC # BLD AUTO: 7.43 THOUSAND/UL (ref 4.31–10.16)

## 2021-05-01 PROCEDURE — 80053 COMPREHEN METABOLIC PANEL: CPT | Performed by: NURSE PRACTITIONER

## 2021-05-01 PROCEDURE — 85025 COMPLETE CBC W/AUTO DIFF WBC: CPT | Performed by: NURSE PRACTITIONER

## 2021-05-01 PROCEDURE — 85610 PROTHROMBIN TIME: CPT | Performed by: NURSE PRACTITIONER

## 2021-05-01 PROCEDURE — 80143 DRUG ASSAY ACETAMINOPHEN: CPT | Performed by: NURSE PRACTITIONER

## 2021-05-01 PROCEDURE — 99232 SBSQ HOSP IP/OBS MODERATE 35: CPT | Performed by: INTERNAL MEDICINE

## 2021-05-01 PROCEDURE — 84100 ASSAY OF PHOSPHORUS: CPT | Performed by: NURSE PRACTITIONER

## 2021-05-01 PROCEDURE — 83735 ASSAY OF MAGNESIUM: CPT | Performed by: NURSE PRACTITIONER

## 2021-05-01 PROCEDURE — 93010 ELECTROCARDIOGRAM REPORT: CPT | Performed by: INTERNAL MEDICINE

## 2021-05-01 PROCEDURE — 93005 ELECTROCARDIOGRAM TRACING: CPT

## 2021-05-01 RX ORDER — FAMOTIDINE 20 MG/1
20 TABLET, FILM COATED ORAL
Status: DISCONTINUED | OUTPATIENT
Start: 2021-05-01 | End: 2021-05-03 | Stop reason: HOSPADM

## 2021-05-01 RX ADMIN — SODIUM CHLORIDE, SODIUM GLUCONATE, SODIUM ACETATE, POTASSIUM CHLORIDE, MAGNESIUM CHLORIDE, SODIUM PHOSPHATE, DIBASIC, AND POTASSIUM PHOSPHATE 75 ML/HR: .53; .5; .37; .037; .03; .012; .00082 INJECTION, SOLUTION INTRAVENOUS at 05:54

## 2021-05-01 RX ADMIN — FAMOTIDINE 20 MG: 20 TABLET ORAL at 15:31

## 2021-05-01 RX ADMIN — FAMOTIDINE 20 MG: 10 INJECTION INTRAVENOUS at 08:35

## 2021-05-01 NOTE — PLAN OF CARE
Problem: PAIN - ADULT  Goal: Verbalizes/displays adequate comfort level or baseline comfort level  Description: Interventions:  - Encourage patient to monitor pain and request assistance  - Assess pain using appropriate pain scale  - Administer analgesics based on type and severity of pain and evaluate response  - Implement non-pharmacological measures as appropriate and evaluate response  - Consider cultural and social influences on pain and pain management  - Notify physician/advanced practitioner if interventions unsuccessful or patient reports new pain  Outcome: Progressing     Problem: INFECTION - ADULT  Goal: Absence or prevention of progression during hospitalization  Description: INTERVENTIONS:  - Assess and monitor for signs and symptoms of infection  - Monitor lab/diagnostic results  - Monitor all insertion sites, i e  indwelling lines, tubes, and drains  - Monitor endotracheal if appropriate and nasal secretions for changes in amount and color  - [place] appropriate cooling/warming therapies per order  - Administer medications as ordered  - Instruct and encourage patient and family to use good hand hygiene technique  - Identify and instruct in appropriate isolation precautions for identified infection/condition  Outcome: Progressing  Goal: Absence of fever/infection during neutropenic period  Description: INTERVENTIONS:  - Monitor WBC    Outcome: Progressing     Problem: SAFETY ADULT  Goal: Patient will remain free of falls  Description: INTERVENTIONS:  - Assess patient frequently for physical needs  -  Identify cognitive and physical deficits and behaviors that affect risk of falls    -  [place] fall precautions as indicated by assessment   - Educate patient/family on patient safety including physical limitations  - Instruct patient to call for assistance with activity based on assessment  - Modify environment to reduce risk of injury  - Consider OT/PT consult to assist with strengthening/mobility  Outcome: Progressing  Goal: Maintain or return to baseline ADL function  Description: INTERVENTIONS:  -  Assess patient's ability to carry out ADLs; assess patient's baseline for ADL function and identify physical deficits which impact ability to perform ADLs (bathing, care of mouth/teeth, toileting, grooming, dressing, etc )  - Assess/evaluate cause of self-care deficits   - Assess range of motion  - Assess patient's mobility; develop plan if impaired  - Assess patient's need for assistive devices and provide as appropriate  - Encourage maximum independence but intervene and supervise when necessary  - Involve family in performance of ADLs  - Assess for home care needs following discharge   - Consider OT consult to assist with ADL evaluation and planning for discharge  - Provide patient education as appropriate  Outcome: Progressing  Goal: Maintain or return mobility status to optimal level  Description: INTERVENTIONS:  - Assess patient's baseline mobility status (ambulation, transfers, stairs, etc )    - Identify cognitive and physical deficits and behaviors that affect mobility  - Identify mobility aids required to assist with transfers and/or ambulation (gait belt, sit-to-stand, lift, walker, cane, etc )  - [place] fall precautions as indicated by assessment  - Record patient progress and toleration of activity level on Mobility SBAR; progress patient to next Phase/Stage  - Instruct patient to call for assistance with activity based on assessment  - Consider rehabilitation consult to assist with strengthening/weightbearing, etc   Outcome: Progressing     Problem: DISCHARGE PLANNING  Goal: Discharge to home or other facility with appropriate resources  Description: INTERVENTIONS:  - Identify barriers to discharge w/patient and caregiver  - Arrange for needed discharge resources and transportation as appropriate  - Identify discharge learning needs (meds, wound care, etc )  - Arrange for interpretive services to assist at discharge as needed  - Refer to Case Management Department for coordinating discharge planning if the patient needs post-hospital services based on physician/advanced practitioner order or complex needs related to functional status, cognitive ability, or social support system  Outcome: Progressing     Problem: Knowledge Deficit  Goal: Patient/family/caregiver demonstrates understanding of disease process, treatment plan, medications, and discharge instructions  Description: Complete learning assessment and assess knowledge base    Interventions:  - Provide teaching at level of understanding  - Provide teaching via preferred learning methods  Outcome: Progressing     Problem: CARDIOVASCULAR - ADULT  Goal: Maintains optimal cardiac output and hemodynamic stability  Description: INTERVENTIONS:  - Monitor I/O, vital signs and rhythm  - Monitor for S/S and trends of decreased cardiac output  - Administer and titrate ordered vasoactive medications to optimize hemodynamic stability  - Assess quality of pulses, skin color and temperature  - Assess for signs of decreased coronary artery perfusion  - Instruct patient to report change in severity of symptoms  Outcome: Progressing  Goal: Absence of cardiac dysrhythmias or at baseline rhythm  Description: INTERVENTIONS:  - Continuous cardiac monitoring, vital signs, obtain 12 lead EKG if ordered  - Administer antiarrhythmic and heart rate control medications as ordered  - Monitor electrolytes and administer replacement therapy as ordered  Outcome: Progressing     Problem: GASTROINTESTINAL - ADULT  Goal: Minimal or absence of nausea and/or vomiting  Description: INTERVENTIONS:  - Administer IV fluids if ordered to ensure adequate hydration  - Maintain NPO status until nausea and vomiting are resolved  - Nasogastric tube if ordered  - Administer ordered antiemetic medications as needed  - Provide nonpharmacologic comfort measures as appropriate  - Advance diet as tolerated, if ordered  - Consider nutrition services referral to assist patient with adequate nutrition and appropriate food choices  Outcome: Progressing  Goal: Maintains adequate nutritional intake  Description: INTERVENTIONS:  - Monitor percentage of each meal consumed  - Identify factors contributing to decreased intake, treat as appropriate  - Assist with meals as needed  - Monitor I&O, weight, and lab values if indicated  - Obtain nutrition services referral as needed  Outcome: Progressing     Problem: METABOLIC, FLUID AND ELECTROLYTES - ADULT  Goal: Electrolytes maintained within normal limits  Description: INTERVENTIONS:  - Monitor labs and assess patient for signs and symptoms of electrolyte imbalances  - Administer electrolyte replacement as ordered  - Monitor response to electrolyte replacements, including repeat lab results as appropriate  - Instruct patient on fluid and nutrition as appropriate  Outcome: Progressing  Goal: Fluid balance maintained  Description: INTERVENTIONS:  - Monitor labs   - Monitor I/O and WT  - Instruct patient on fluid and nutrition as appropriate  - Assess for signs & symptoms of volume excess or deficit  Outcome: Progressing  Goal: Glucose maintained within target range  Description: INTERVENTIONS:  - Monitor Blood Glucose as ordered  - Assess for signs and symptoms of hyperglycemia and hypoglycemia  - Administer ordered medications to maintain glucose within target range  - Assess nutritional intake and initiate nutrition service referral as needed  Outcome: Progressing     Problem: COPING  Goal: Pt/Family able to verbalize concerns and demonstrate effective coping strategies  Description: INTERVENTIONS:  - Assist patient/family to identify coping skills, available support systems and cultural and spiritual values  - Provide emotional support, including active listening and acknowledgement of concerns of patient and caregivers  - Reduce environmental stimuli, as able  - Provide patient education  - Assess for spiritual pain/suffering and initiate spiritual care, including notification of Pastoral Care or mani based community as needed  - Assess effectiveness of coping strategies  Outcome: Progressing  Goal: Will report anxiety at manageable levels  Description: INTERVENTIONS:  - Administer medication as ordered  - Teach and encourage coping skills  - Provide emotional support  - Assess patient/family for anxiety and ability to cope  Outcome: Progressing     Problem: SELF HARM  Goal: Effect of psychiatric condition will be minimized and patient will be protected from self harm  Description: INTERVENTIONS:  - Assess impact of patient's symptoms on level of functioning, self-care needs and offer support as indicated  - Assess patient/family knowledge of depression, impact on illness and need for teaching  - Provide emotional support, presence and reassurance  - Assess for possible suicidal thoughts, ideation or self-harm  If patient expresses suicidal thoughts or statements do not leave alone, notify physician/AP immediately, initiate suicide precautions, and determine need for continual observation    - initiate consults and referrals as appropriate (a mental health professional, Spiritual Care  Outcome: Progressing

## 2021-05-01 NOTE — ASSESSMENT & PLAN NOTE
Poly drug hold does with intention to self-harm  Clinically improved  Toxicology input noted  Monitor CMP INR

## 2021-05-01 NOTE — ASSESSMENT & PLAN NOTE
Patient with major depressive disorder with polydrug overdose with suicidal intent  Continue one-to-one continuous observation  Psychiatry input noted  Awaiting transfer to inpatient psychiatry

## 2021-05-01 NOTE — PLAN OF CARE
Problem: PAIN - ADULT  Goal: Verbalizes/displays adequate comfort level or baseline comfort level  Description: Interventions:  - Encourage patient to monitor pain and request assistance  - Assess pain using appropriate pain scale  - Administer analgesics based on type and severity of pain and evaluate response  - Implement non-pharmacological measures as appropriate and evaluate response  - Consider cultural and social influences on pain and pain management  - Notify physician/advanced practitioner if interventions unsuccessful or patient reports new pain  Outcome: Progressing     Problem: INFECTION - ADULT  Goal: Absence or prevention of progression during hospitalization  Description: INTERVENTIONS:  - Assess and monitor for signs and symptoms of infection  - Monitor lab/diagnostic results  - Monitor all insertion sites, i e  indwelling lines, tubes, and drains  - Monitor endotracheal if appropriate and nasal secretions for changes in amount and color  - [place] appropriate cooling/warming therapies per order  - Administer medications as ordered  - Instruct and encourage patient and family to use good hand hygiene technique  - Identify and instruct in appropriate isolation precautions for identified infection/condition  Outcome: Progressing  Goal: Absence of fever/infection during neutropenic period  Description: INTERVENTIONS:  - Monitor WBC    Outcome: Progressing     Problem: SAFETY ADULT  Goal: Patient will remain free of falls  Description: INTERVENTIONS:  - Assess patient frequently for physical needs  -  Identify cognitive and physical deficits and behaviors that affect risk of falls    -  [place] fall precautions as indicated by assessment   - Educate patient/family on patient safety including physical limitations  - Instruct patient to call for assistance with activity based on assessment  - Modify environment to reduce risk of injury  - Consider OT/PT consult to assist with strengthening/mobility  Outcome: Progressing  Goal: Maintain or return to baseline ADL function  Description: INTERVENTIONS:  -  Assess patient's ability to carry out ADLs; assess patient's baseline for ADL function and identify physical deficits which impact ability to perform ADLs (bathing, care of mouth/teeth, toileting, grooming, dressing, etc )  - Assess/evaluate cause of self-care deficits   - Assess range of motion  - Assess patient's mobility; develop plan if impaired  - Assess patient's need for assistive devices and provide as appropriate  - Encourage maximum independence but intervene and supervise when necessary  - Involve family in performance of ADLs  - Assess for home care needs following discharge   - Consider OT consult to assist with ADL evaluation and planning for discharge  - Provide patient education as appropriate  Outcome: Progressing  Goal: Maintain or return mobility status to optimal level  Description: INTERVENTIONS:  - Assess patient's baseline mobility status (ambulation, transfers, stairs, etc )    - Identify cognitive and physical deficits and behaviors that affect mobility  - Identify mobility aids required to assist with transfers and/or ambulation (gait belt, sit-to-stand, lift, walker, cane, etc )  - [place] fall precautions as indicated by assessment  - Record patient progress and toleration of activity level on Mobility SBAR; progress patient to next Phase/Stage  - Instruct patient to call for assistance with activity based on assessment  - Consider rehabilitation consult to assist with strengthening/weightbearing, etc   Outcome: Progressing     Problem: DISCHARGE PLANNING  Goal: Discharge to home or other facility with appropriate resources  Description: INTERVENTIONS:  - Identify barriers to discharge w/patient and caregiver  - Arrange for needed discharge resources and transportation as appropriate  - Identify discharge learning needs (meds, wound care, etc )  - Arrange for interpretive services to assist at discharge as needed  - Refer to Case Management Department for coordinating discharge planning if the patient needs post-hospital services based on physician/advanced practitioner order or complex needs related to functional status, cognitive ability, or social support system  Outcome: Progressing     Problem: Knowledge Deficit  Goal: Patient/family/caregiver demonstrates understanding of disease process, treatment plan, medications, and discharge instructions  Description: Complete learning assessment and assess knowledge base    Interventions:  - Provide teaching at level of understanding  - Provide teaching via preferred learning methods  Outcome: Progressing     Problem: CARDIOVASCULAR - ADULT  Goal: Maintains optimal cardiac output and hemodynamic stability  Description: INTERVENTIONS:  - Monitor I/O, vital signs and rhythm  - Monitor for S/S and trends of decreased cardiac output  - Administer and titrate ordered vasoactive medications to optimize hemodynamic stability  - Assess quality of pulses, skin color and temperature  - Assess for signs of decreased coronary artery perfusion  - Instruct patient to report change in severity of symptoms  Outcome: Progressing  Goal: Absence of cardiac dysrhythmias or at baseline rhythm  Description: INTERVENTIONS:  - Continuous cardiac monitoring, vital signs, obtain 12 lead EKG if ordered  - Administer antiarrhythmic and heart rate control medications as ordered  - Monitor electrolytes and administer replacement therapy as ordered  Outcome: Progressing     Problem: GASTROINTESTINAL - ADULT  Goal: Minimal or absence of nausea and/or vomiting  Description: INTERVENTIONS:  - Administer IV fluids if ordered to ensure adequate hydration  - Maintain NPO status until nausea and vomiting are resolved  - Nasogastric tube if ordered  - Administer ordered antiemetic medications as needed  - Provide nonpharmacologic comfort measures as appropriate  - Advance diet as tolerated, if ordered  - Consider nutrition services referral to assist patient with adequate nutrition and appropriate food choices  Outcome: Progressing  Goal: Maintains adequate nutritional intake  Description: INTERVENTIONS:  - Monitor percentage of each meal consumed  - Identify factors contributing to decreased intake, treat as appropriate  - Assist with meals as needed  - Monitor I&O, weight, and lab values if indicated  - Obtain nutrition services referral as needed  Outcome: Progressing     Problem: METABOLIC, FLUID AND ELECTROLYTES - ADULT  Goal: Electrolytes maintained within normal limits  Description: INTERVENTIONS:  - Monitor labs and assess patient for signs and symptoms of electrolyte imbalances  - Administer electrolyte replacement as ordered  - Monitor response to electrolyte replacements, including repeat lab results as appropriate  - Instruct patient on fluid and nutrition as appropriate  Outcome: Progressing  Goal: Fluid balance maintained  Description: INTERVENTIONS:  - Monitor labs   - Monitor I/O and WT  - Instruct patient on fluid and nutrition as appropriate  - Assess for signs & symptoms of volume excess or deficit  Outcome: Progressing  Goal: Glucose maintained within target range  Description: INTERVENTIONS:  - Monitor Blood Glucose as ordered  - Assess for signs and symptoms of hyperglycemia and hypoglycemia  - Administer ordered medications to maintain glucose within target range  - Assess nutritional intake and initiate nutrition service referral as needed  Outcome: Progressing     Problem: COPING  Goal: Pt/Family able to verbalize concerns and demonstrate effective coping strategies  Description: INTERVENTIONS:  - Assist patient/family to identify coping skills, available support systems and cultural and spiritual values  - Provide emotional support, including active listening and acknowledgement of concerns of patient and caregivers  - Reduce environmental stimuli, as able  - Provide patient education  - Assess for spiritual pain/suffering and initiate spiritual care, including notification of Pastoral Care or mani based community as needed  - Assess effectiveness of coping strategies  Outcome: Progressing  Goal: Will report anxiety at manageable levels  Description: INTERVENTIONS:  - Administer medication as ordered  - Teach and encourage coping skills  - Provide emotional support  - Assess patient/family for anxiety and ability to cope  Outcome: Progressing     Problem: SELF HARM  Goal: Effect of psychiatric condition will be minimized and patient will be protected from self harm  Description: INTERVENTIONS:  - Assess impact of patient's symptoms on level of functioning, self-care needs and offer support as indicated  - Assess patient/family knowledge of depression, impact on illness and need for teaching  - Provide emotional support, presence and reassurance  - Assess for possible suicidal thoughts, ideation or self-harm  If patient expresses suicidal thoughts or statements do not leave alone, notify physician/AP immediately, initiate suicide precautions, and determine need for continual observation    - initiate consults and referrals as appropriate (a mental health professional, Spiritual Care  Outcome: Progressing

## 2021-05-01 NOTE — QUICK NOTE
From an APAP standpoint, patient is toxicologically cleared  NAC has been stopped, LFTs remain WNL, and APAP < 10

## 2021-05-01 NOTE — PROGRESS NOTES
[facility]  Progress Note Benton Ink 2000, 23 y o  female MRN: [id number]  Unit/Bed#: [facility] 709-01 Encounter: [id number]  Primary Care Provider: Ekaterina Palacios   Date and time admitted to hospital: 4/30/2021  4:36 AM    * Tylenol overdose, intentional self-harm, initial encounter [facility])  Assessment & Plan  Poly drug hold does with intention to self-harm  Clinically improved  Toxicology input noted  Monitor CMP INR    Major depressive disorder with single episode  Assessment & Plan  Patient with major depressive disorder with polydrug overdose with suicidal intent  Continue one-to-one continuous observation  Psychiatry input noted  Awaiting transfer to inpatient psychiatry    Hypokalemia  Assessment & Plan  Replete  Monitor    Prolonged QT interval  Assessment & Plan  · QT prolongation improving  · Monitor EKG  · Avoid QT prolonging medications      VTE Pharmacologic Prophylaxis:   Pharmacologic: Vena dynes  Mechanical VTE Prophylaxis in Place: Yes    Patient Centered Rounds: I have performed bedside rounds with nursing staff today  Discussions with Specialists or Other Care Team Provider:     Education and Discussions with Family / Patient:  Discussed with the patient, offered to call family patient declined reports she is keeping her family updated    Time Spent for Care: 30 minutes  More than 50% of total time spent on counseling and coordination of care as described above  Current Length of Stay: 1 day(s)    Current Patient Status: Inpatient   Certification Statement: The patient will continue to require additional inpatient hospital stay due to As outlined    Discharge Plan:  Awaiting inpatient psychiatry transfer  Patient is medically stable for transfer to inpatient psychiatry case management following    Code Status: Level 1 - Full Code      Subjective:     Sitting up in bed  Reports feeling better  Tolerating p o    Encouraged out of bed and ambulation as able  History chart labs medications    Objective:     Vitals:   Temp (24hrs), Av 7 °F (37 1 °C), Min:97 8 °F (36 6 °C), Max:99 4 °F (37 4 °C)    Temp:  [97 8 °F (36 6 °C)-99 4 °F (37 4 °C)] 97 8 °F (36 6 °C)  HR:  [64-87] 86  Resp:  [16-18] 18  BP: (101-124)/(59-92) 101/65  SpO2:  [98 %-100 %] 99 %  Body mass index is 18 66 kg/m²  Input and Output Summary (last 24 hours): Intake/Output Summary (Last 24 hours) at 2021 1229  Last data filed at 2021 1001  Gross per 24 hour   Intake 1431 25 ml   Output --   Net 1431 25 ml       Physical Exam:     Physical Exam    Comfortably sitting up in bed  Neck supple  Lungs clear to auscultation  Heart sounds S1 and S2 noted  Abdomen soft nontender  Awake obeys simple commands  Pulses noted  No rash  No pedal edema    Additional Data:     Labs:    Results from last 7 days   Lab Units 21  0500   WBC Thousand/uL 7 43   HEMOGLOBIN g/dL 12 8   HEMATOCRIT % 40 4   PLATELETS Thousands/uL 385   NEUTROS PCT % 49   LYMPHS PCT % 41   MONOS PCT % 8   EOS PCT % 1     Results from last 7 days   Lab Units 21  0501   SODIUM mmol/L 139   POTASSIUM mmol/L 3 9   CHLORIDE mmol/L 109*   CO2 mmol/L 20*   BUN mg/dL 3*   CREATININE mg/dL 0 81   ANION GAP mmol/L 10   CALCIUM mg/dL 8 8   ALBUMIN g/dL 3 7   TOTAL BILIRUBIN mg/dL 0 75   ALK PHOS U/L 65   ALT U/L 16   AST U/L 11   GLUCOSE RANDOM mg/dL 71     Results from last 7 days   Lab Units 21  0500   INR  1 35*                       * I Have Reviewed All Lab Data Listed Above  * Additional Pertinent Lab Tests Reviewed:  All Labs Within Last 24 Hours Reviewed    Imaging:    Imaging Reports Reviewed Today Include:  Imaging studies noted  Imaging Personally Reviewed by Myself Includes:     Recent Cultures (last 7 days):           Last 24 Hours Medication List:   Current Facility-Administered Medications   Medication Dose Route Frequency Provider Last Rate    famotidine  20 mg Oral BID JAKE Parra Daniel Schulz MD          Today, Patient Was Seen By: Lanre Blanca MD    ** Please Note: Dictation voice to text software may have been used in the creation of this document   **

## 2021-05-02 LAB
ALBUMIN SERPL BCP-MCNC: 3.8 G/DL (ref 3.5–5)
ALP SERPL-CCNC: 70 U/L (ref 46–116)
ALT SERPL W P-5'-P-CCNC: 18 U/L (ref 12–78)
ANION GAP SERPL CALCULATED.3IONS-SCNC: 5 MMOL/L (ref 4–13)
AST SERPL W P-5'-P-CCNC: 9 U/L (ref 5–45)
BILIRUB SERPL-MCNC: 0.43 MG/DL (ref 0.2–1)
BUN SERPL-MCNC: 10 MG/DL (ref 5–25)
CALCIUM SERPL-MCNC: 9 MG/DL (ref 8.3–10.1)
CHLORIDE SERPL-SCNC: 110 MMOL/L (ref 100–108)
CO2 SERPL-SCNC: 25 MMOL/L (ref 21–32)
CREAT SERPL-MCNC: 0.79 MG/DL (ref 0.6–1.3)
GFR SERPL CREATININE-BSD FRML MDRD: 107 ML/MIN/1.73SQ M
GLUCOSE SERPL-MCNC: 83 MG/DL (ref 65–140)
INR PPP: 1.15 (ref 0.84–1.19)
POTASSIUM SERPL-SCNC: 3.8 MMOL/L (ref 3.5–5.3)
PROT SERPL-MCNC: 7.1 G/DL (ref 6.4–8.2)
PROTHROMBIN TIME: 14.7 SECONDS (ref 11.6–14.5)
SODIUM SERPL-SCNC: 140 MMOL/L (ref 136–145)

## 2021-05-02 PROCEDURE — 99232 SBSQ HOSP IP/OBS MODERATE 35: CPT | Performed by: INTERNAL MEDICINE

## 2021-05-02 PROCEDURE — 85610 PROTHROMBIN TIME: CPT | Performed by: INTERNAL MEDICINE

## 2021-05-02 PROCEDURE — 80053 COMPREHEN METABOLIC PANEL: CPT | Performed by: INTERNAL MEDICINE

## 2021-05-02 RX ORDER — MAGNESIUM HYDROXIDE/ALUMINUM HYDROXICE/SIMETHICONE 120; 1200; 1200 MG/30ML; MG/30ML; MG/30ML
30 SUSPENSION ORAL EVERY 4 HOURS PRN
Status: DISCONTINUED | OUTPATIENT
Start: 2021-05-02 | End: 2021-05-03 | Stop reason: HOSPADM

## 2021-05-02 RX ORDER — CALCIUM CARBONATE 200(500)MG
500 TABLET,CHEWABLE ORAL DAILY PRN
Status: DISCONTINUED | OUTPATIENT
Start: 2021-05-02 | End: 2021-05-03 | Stop reason: HOSPADM

## 2021-05-02 RX ORDER — IBUPROFEN 400 MG/1
200 TABLET ORAL EVERY 8 HOURS PRN
Status: DISCONTINUED | OUTPATIENT
Start: 2021-05-02 | End: 2021-05-03 | Stop reason: HOSPADM

## 2021-05-02 RX ADMIN — IBUPROFEN 200 MG: 400 TABLET ORAL at 14:55

## 2021-05-02 RX ADMIN — FAMOTIDINE 20 MG: 20 TABLET ORAL at 06:30

## 2021-05-02 RX ADMIN — FAMOTIDINE 20 MG: 20 TABLET ORAL at 15:29

## 2021-05-02 NOTE — PROGRESS NOTES
[address] [facility]  Progress Note Naty Drilling 2000, 23 y o  female MRN: [id number]  [facility] En[facility]: [id number]  Primary Care Provider: Honey Joseph   Date and time admitted to hospital: 2021  4:36 AM    * Tylenol overdose, intentional self-harm, initial encounter [facility])  Assessment & Plan  Poly drug overdose with intention to self-harm  Clinically improved  Toxicology input noted      Major depressive disorder with single episode  Assessment & Plan  Patient with major depressive disorder with polydrug overdose with suicidal intent  Continue one-to-one continuous observation  Psychiatry input noted  Awaiting transfer to inpatient psychiatry    Hypokalemia  Assessment & Plan  Replete  Monitor    Prolonged QT interval  Assessment & Plan  · QT prolongation resolved  · Avoid QT prolonging medications        VTE Pharmacologic Prophylaxis:   Pharmacologic: Vena dynes  Mechanical VTE Prophylaxis in Place: Yes    Patient Centered Rounds: I have performed bedside rounds with nursing staff today  Discussions with Specialists or Other Care Team Provider:     Education and Discussions with Family / Patient:  Discussed with the patient offered to call family patient declined    Time Spent for Care: 20 minutes  More than 50% of total time spent on counseling and coordination of care as described above      Current Length of Stay: 2 day(s)    Current Patient Status: Inpatient   Certification Statement: The patient will continue to require additional inpatient hospital stay due to As outlined    Discharge Plan:  Pending placement inpatient psychiatry case management following    Code Status: Level 1 - Full Code      Subjective:     Comfortably in bed  Encouraged out of bed into chair  Encouraged ambulation as able  Reports feeling better    Objective:     Vitals:   Temp (24hrs), Av 6 °F (37 °C), Min:98 1 °F (36 7 °C), Max:98 9 °F (37 2 °C)    Temp:  [98 1 °F (36 7 °C)-98 9 °F (37 2 °C)] 98 1 °F (36 7 °C)  HR:  [82-87] 83  Resp:  [16] 16  BP: (104-130)/(67-79) 104/67  SpO2:  [95 %-99 %] 95 %  Body mass index is 18 66 kg/m²  Input and Output Summary (last 24 hours): Intake/Output Summary (Last 24 hours) at 5/2/2021 1301  Last data filed at 5/1/2021 1501  Gross per 24 hour   Intake 480 ml   Output --   Net 480 ml       Physical Exam:     Physical Exam    Comfortably in bed  Neck supple  Lungs vesicular breath sounds  No additional sounds  Heart sounds S1 and S2 noted  Abdomen soft nontender  Awake obeys simple commands  Pulses noted  No rash  No pedal edema    Additional Data:     Labs:    Results from last 7 days   Lab Units 05/01/21  0500   WBC Thousand/uL 7 43   HEMOGLOBIN g/dL 12 8   HEMATOCRIT % 40 4   PLATELETS Thousands/uL 385   NEUTROS PCT % 49   LYMPHS PCT % 41   MONOS PCT % 8   EOS PCT % 1     Results from last 7 days   Lab Units 05/02/21  0435   SODIUM mmol/L 140   POTASSIUM mmol/L 3 8   CHLORIDE mmol/L 110*   CO2 mmol/L 25   BUN mg/dL 10   CREATININE mg/dL 0 79   ANION GAP mmol/L 5   CALCIUM mg/dL 9 0   ALBUMIN g/dL 3 8   TOTAL BILIRUBIN mg/dL 0 43   ALK PHOS U/L 70   ALT U/L 18   AST U/L 9   GLUCOSE RANDOM mg/dL 83     Results from last 7 days   Lab Units 05/02/21  0435   INR  1 15                       * I Have Reviewed All Lab Data Listed Above  * Additional Pertinent Lab Tests Reviewed: All Labs Within Last 24 Hours Reviewed    Imaging:    Imaging Reports Reviewed Today Include:   Imaging Personally Reviewed by Myself Includes:     Recent Cultures (last 7 days):           Last 24 Hours Medication List:   Current Facility-Administered Medications   Medication Dose Route Frequency Provider Last Rate    famotidine  20 mg Oral BID AC Shahid Gilliam MD          Today, Patient Was Seen By: Shahid Gilliam MD    ** Please Note: Dictation voice to text software may have been used in the creation of this document   **

## 2021-05-02 NOTE — CASE MANAGEMENT
LOS 2  Unplanned readmission risk score: 7  Not a bundle  Met with pt and pt's mother to discuss the role of CM and to discuss any help pt may need prior to dc  Pt provided CM with permission to speak with her mother at bedside  Pt is currently residing at [facility] in a dorm room  Pt performed ADL's indptly pta, no use of DME  No hx of HHC or rehab  No hx of mental health or D&A treatment  Pt drives  CM discussed need/recommendation for IP mental health treatment at this time  Pt is agreeable  Pt confirmed she did sign a 201  CM discussed treatment  Pt is requesting to stay local within the [place] [address] or [facility] network  CM called [facility] [place] [facility] and per voicemail; no beds available today within the network  CM spoke to [place] at [facility] who states no beds available  Pt provided updated insurance cards; CM faxed to financial counselor  Primary: [address] [facility]  Secondary: [employer]    Contact: Julianna De Luna (mother) [phone]  No POA or living will  Pt will need transport at dc

## 2021-05-02 NOTE — UTILIZATION REVIEW
Initial Clinical Review    Admission: Date/Time/Statement:   Admission Orders (From admission, onward)     Ordered        04/30/21 0709  Inpatient Admission  Once                   Orders Placed This Encounter   Procedures    Inpatient Admission     Standing Status:   Standing     Number of Occurrences:   1     Order Specific Question:   Level of Care     Answer:   Med Surg [16]     Order Specific Question:   Estimated length of stay     Answer:   More than 2 Midnights     Order Specific Question:   Certification     Answer:   I certify that inpatient services are medically necessary for this patient for a duration of greater than two midnights  See H&P and MD Progress Notes for additional information about the patient's course of treatment  ED Arrival Information     Expected Arrival Acuity Means of Arrival Escorted By Service Admission Type    - 4/30/2021 04:36 Emergent Ambulance [facility] Hospitalist Emergency    Arrival Complaint    Overdose         Chief Complaint   Patient presents with    Overdose - Intentional     pt took 10 tylenol, 10 midiol, 10 excederine, 6 zquills at 11pm to harm herself due to stress       Initial Presentation:  24 y/o female presents to ED by EMS with intentional overdose  Pt reported taking 10 Tylenol ES, 10 Excedrin, 10 Midol, and 6 Zquil tablets ~ 2300H this evening, and several hours later developed epigastric/abdominal pain a/w N/V  She stated that the ingestion was intentional with attempt to harm herself, and states stressors in life including difficulty with course work, boyfriend cheated on her, and apparently being cut off by parents  States h/o depression, not on any anti-depressants, denies previous suicide attempts  In ED tylenol level 234, salicylic level 14, prolong QTc to 549 ms  Started on NAC  Admit inpatient to M/S/Tele unit -- Telem monitoring, monitor serial CMP, INR  1:1 staff observation  Psych, Toxicology consults       Toxicology consult 4/30 -- q 6 CMP, INR, APAP should be obtained  N-acetylcysteine can be discontinued when LFTs are downtrending x 2/stable x2, INR < 2, and APAP < 10  Repeat EKG to reassess QTc prolongation  Goal QTc < 500  Please continue to give magnesium mm optimize electrolytes until goal has been met      Psych consult 4/30 -- Continue medical management  Continue 1-1 observation  IP psych admission medically cleared and bed available patient is a 201    Date: 5/1   Day 2: Tylenol level <2  QTc improving  Continue 1:1   Awaiting bed acceptance in IP behavioral health unit    ED Triage Vitals   Temperature Pulse Respirations Blood Pressure SpO2   04/30/21 0455 04/30/21 0445 04/30/21 0445 04/30/21 0445 04/30/21 0445   97 9 °F (36 6 °C) 86 18 133/64 98 %      Temp Source Heart Rate Source Patient Position - Orthostatic VS BP Location FiO2 (%)   04/30/21 0455 04/30/21 0445 04/30/21 0445 04/30/21 0445 --   Oral Monitor Lying Left arm       Pain Score       04/30/21 1535       No Pain          Wt Readings from Last 1 Encounters:   04/30/21 46 3 kg (102 lb)     Additional Vital Signs:   Date/Time  Temp  Pulse  Resp  BP  MAP (mmHg)  SpO2  O2 Device   05/01/21 20:35:07  98 9 °F (37 2 °C)  82  --  113/70  84  98 %  --   05/01/21 2000  --  --  --  --  --  --  None (Room air)   05/01/21 15:30:24  98 8 °F (37 1 °C)  87  --  130/79  96  99 %  --   05/01/21 0715  --  --  --  --  --  --  None (Room air)   05/01/21 06:57:38  97 8 °F (36 6 °C)  86  18  101/65  77  99 %  --   04/30/21 2153  --  --  --  --  --  --  None (Room air)   04/30/21 21:42:07  99 4 °F (37 4 °C)  87  --  105/72  83  98 %  --   04/30/21 1541  --  --  --  --  --  --  None (Room air)   04/30/21 15:37:15  98 9 °F (37 2 °C)  64  --  108/69  82  99 %  --   04/30/21 1400  --  64  16  109/59  79  100 %  --   04/30/21 1330  --  66  16  124/66  88  100 %  --   04/30/21 1308  --  80  18  124/92  --  100 %  None (Room air)   04/30/21 1200  --  74  16  114/73  88  99 %  --   04/30/21 1139 --  75  16  123/68  --  100 %  None (Room air)   04/30/21 1015  --  80  16  125/81  --  100 %  --   04/30/21 0900  --  74  16  116/71  88  100 %  --   04/30/21 0836  --  86  18  140/64  --  99 %  None (Room air)   04/30/21 0733  --  70  16  105/60  --  96 %  None (Room air)   04/30/21 0715  --  76  16  --  --  100 %  --   04/30/21 0630  --  96  18  126/76  95  98 %  None (Room air)   04/30/21 0626  --  92  18  127/78  --  96 %  None (Room air)   04/30/21 0455  97 9 °F (36 6 °C)  --  --  --  --  --  --   04/30/21 0445  --  86  18  133/64  92  98 %  None (Room air)       Pertinent Labs/Diagnostic Test Results:   EKG 4/30 -- Sinus rhythm with sinus arrhythmia, prolonged QTC 549ms  EKG 5/1 -- sinus rhythm with short MA, nonspecific T was abnl   QTc 405ms    Results from last 7 days   Lab Units 04/30/21  0454   SARS-COV-2  Negative     Results from last 7 days   Lab Units 05/01/21  0500 04/30/21  0454   WBC Thousand/uL 7 43 7 60   HEMOGLOBIN g/dL 12 8 13 5   HEMATOCRIT % 40 4 42 5   PLATELETS Thousands/uL 385 416*   NEUTROS ABS Thousands/µL 3 64 6 28     Results from last 7 days   Lab Units 05/02/21  0435 05/01/21  0501 05/01/21  0500 05/01/21  0039 04/30/21  1748 04/30/21  1146   SODIUM mmol/L 140 139  --  140 138 137   POTASSIUM mmol/L 3 8 3 9  --  3 8 3 7 3 3*   CHLORIDE mmol/L 110* 109*  --  111* 109* 107   CO2 mmol/L 25 20*  --  22 22 20*   ANION GAP mmol/L 5 10  --  7 7 10   BUN mg/dL 10 3*  --  4* 4* 5   CREATININE mg/dL 0 79 0 81  --  0 76 0 78 0 81   EGFR ml/min/1 73sq m 107 104  --  112 109 104   CALCIUM mg/dL 9 0 8 8  --  8 9 8 7 8 6   MAGNESIUM mg/dL  --   --  2 4  --   --  2 4   PHOSPHORUS mg/dL  --   --  2 0*  --   --   --      Results from last 7 days   Lab Units 05/02/21  0435 05/01/21  0501 05/01/21  0039 04/30/21  1748 04/30/21  1146   AST U/L 9 11 13 12 11   ALT U/L 18 16 19 15 17   ALK PHOS U/L 70 65 69 64 67   TOTAL PROTEIN g/dL 7 1 7 1 7 4 7 5 7 7   ALBUMIN g/dL 3 8 3 7 4 0 3 9 3 9   TOTAL BILIRUBIN mg/dL 0 43 0 75 0 55 0 57 0 60     Results from last 7 days   Lab Units 05/02/21  0435 05/01/21  0501 05/01/21  0039 04/30/21  1748 04/30/21  1146 04/30/21  0454   GLUCOSE RANDOM mg/dL 83 71 85 99 141* 151*     Results from last 7 days   Lab Units 04/30/21  0454   PH STONEY  7 370   PCO2 STONEY mm Hg 39 7*   PO2 STONEY mm Hg 27 2*   HCO3 STONEY mmol/L 22 4*   BASE EXC STONEY mmol/L -2 6   O2 CONTENT STONEY ml/dL 9 6   O2 HGB, VENOUS % 47 0*     Results from last 7 days   Lab Units 05/02/21  0435 05/01/21  0500 05/01/21  0039  04/30/21  0456   PROTIME seconds 14 7* 16 6* 16 2*   < > 15 0*   INR  1 15 1 35* 1 30*   < > 1 18   PTT seconds  --   --   --   --  27    < > = values in this interval not displayed  Results from last 7 days   Lab Units 04/30/21  0555   AMPH/METH  Negative   BARBITURATE UR  Negative   BENZODIAZEPINE UR  Negative   COCAINE UR  Negative   METHADONE URINE  Negative   OPIATE UR  Negative   PCP UR  Negative   THC UR  Negative     Results from last 7 days   Lab Units 05/01/21  0501 05/01/21  0039 04/30/21  1748  04/30/21  0724 04/30/21  0454   ETHANOL LVL mg/dL  --   --   --   --   --  <3   ACETAMINOPHEN LVL ug/mL <2* <2* 4*   < >  --  105*   SALICYLATE LVL mg/dL  --   --   --   --  10 14    < > = values in this interval not displayed       ED Treatment:   Medication Administration from 04/30/2021 0436 to 04/30/2021 1528       Date/Time Order Dose Route Action     04/30/2021 0541 acetylcysteine (ACETADOTE) 6,810 mg in dextrose 5 % 200 mL IVPB 6,810 mg Intravenous New Bag     04/30/2021 0725 acetylcysteine (ACETADOTE) 2,270 mg in dextrose 5 % 500 mL IVPB 2,270 mg Intravenous New Bag     04/30/2021 0457 sodium chloride 0 9 % bolus 1,000 mL 1,000 mL Intravenous New Bag     04/30/2021 0502 ondansetron (ZOFRAN) injection 4 mg 4 mg Intravenous Given     04/30/2021 0606 magnesium sulfate 2 g/50 mL IVPB (premix) 2 g 2 g Intravenous New Bag     04/30/2021 0631 metoclopramide (REGLAN) injection 10 mg 10 mg Intravenous Given 04/30/2021 1021 pyridoxine (VITAMIN B6) injection 25 mg 25 mg Intravenous Given     04/30/2021 1022 multi-electrolyte (PLASMALYTE-A/ISOLYTE-S PH 7 4) IV solution 150 mL/hr Intravenous New Bag     04/30/2021 1022 potassium chloride (K-DUR,KLOR-CON) CR tablet 40 mEq 40 mEq Oral Given     04/30/2021 1147 acetylcysteine (ACETADOTE) 4,540 mg in dextrose 5 % 1,000 mL IVPB 4,540 mg Intravenous New Bag     04/30/2021 1021 famotidine (PEPCID) injection 20 mg 20 mg Intravenous Given     Past Medical History:   Diagnosis Date    Anxiety     Depression      Present on Admission:   Tylenol overdose, intentional self-harm, initial encounter ([place] [address] )   Prolonged QT interval      Admitting Diagnosis: Overdose [T50 901A]  Suicide attempt ([place] [address] ) [T14 91XA]  Prolonged Q-T interval on ECG [R94 31]  Intentional drug overdose, initial encounter ([place] [address] ) Vidal Archibald  Age/Sex: 23 y o  female  Admission Orders:  Scheduled Medications:  famotidine, 20 mg, Oral, BID AC    PRN Meds:  aluminum-magnesium hydroxide-simethicone, 30 mL, Oral, Q4H PRN  calcium carbonate, 500 mg, Oral, Daily PRN  ibuprofen, 200 mg, Oral, Q8H PRN 5/2          Network Utilization Review Department  ATTENTION: Please call with any questions or concerns to [phone] and carefully listen to the prompts so that you are directed to the right person  All voicemails are confidential   [facility] all requests for admission clinical reviews, approved or denied determinations and any other requests to dedicated fax number below belonging to the campus where the patient is receiving treatment   List of dedicated fax numbers for the Facilities:  [address] [place] [address] DENIALS (Administrative/Medical Necessity) [phone]   [address] (Maternity/NICU/Pediatrics) [phone]   [address] [facility] [phone]   501 [facility] [address] [place] [address] [address] [phone]   [address] [place], [place] [address]y [phone] [facility]:  I discussed with the patient the risks of golimumab including but not limited to myelosuppression, immunosuppression, autoimmune hepatitis, demyelinating diseases, lymphoma, and serious infections.  The patient understands that monitoring is required including a PPD at baseline and must alert us or the primary physician if symptoms of infection or other concerning signs are noted.

## 2021-05-02 NOTE — PLAN OF CARE
Problem: PAIN - ADULT  Goal: Verbalizes/displays adequate comfort level or baseline comfort level  Description: Interventions:  - Encourage patient to monitor pain and request assistance  - Assess pain using appropriate pain scale  - Administer analgesics based on type and severity of pain and evaluate response  - Implement non-pharmacological measures as appropriate and evaluate response  - Consider cultural and social influences on pain and pain management  - Notify physician/advanced practitioner if interventions unsuccessful or patient reports new pain  Outcome: Progressing     Problem: INFECTION - ADULT  Goal: Absence or prevention of progression during hospitalization  Description: INTERVENTIONS:  - Assess and monitor for signs and symptoms of infection  - Monitor lab/diagnostic results  - Monitor all insertion sites, i e  indwelling lines, tubes, and drains  - Monitor endotracheal if appropriate and nasal secretions for changes in amount and color  - [place] appropriate cooling/warming therapies per order  - Administer medications as ordered  - Instruct and encourage patient and family to use good hand hygiene technique  - Identify and instruct in appropriate isolation precautions for identified infection/condition  Outcome: Progressing  Goal: Absence of fever/infection during neutropenic period  Description: INTERVENTIONS:  - Monitor WBC    Outcome: Progressing     Problem: SAFETY ADULT  Goal: Patient will remain free of falls  Description: INTERVENTIONS:  - Assess patient frequently for physical needs  -  Identify cognitive and physical deficits and behaviors that affect risk of falls    -  [place] fall precautions as indicated by assessment   - Educate patient/family on patient safety including physical limitations  - Instruct patient to call for assistance with activity based on assessment  - Modify environment to reduce risk of injury  - Consider OT/PT consult to assist with strengthening/mobility  Outcome: Progressing  Goal: Maintain or return to baseline ADL function  Description: INTERVENTIONS:  -  Assess patient's ability to carry out ADLs; assess patient's baseline for ADL function and identify physical deficits which impact ability to perform ADLs (bathing, care of mouth/teeth, toileting, grooming, dressing, etc )  - Assess/evaluate cause of self-care deficits   - Assess range of motion  - Assess patient's mobility; develop plan if impaired  - Assess patient's need for assistive devices and provide as appropriate  - Encourage maximum independence but intervene and supervise when necessary  - Involve family in performance of ADLs  - Assess for home care needs following discharge   - Consider OT consult to assist with ADL evaluation and planning for discharge  - Provide patient education as appropriate  Outcome: Progressing  Goal: Maintain or return mobility status to optimal level  Description: INTERVENTIONS:  - Assess patient's baseline mobility status (ambulation, transfers, stairs, etc )    - Identify cognitive and physical deficits and behaviors that affect mobility  - Identify mobility aids required to assist with transfers and/or ambulation (gait belt, sit-to-stand, lift, walker, cane, etc )  - [place] fall precautions as indicated by assessment  - Record patient progress and toleration of activity level on Mobility SBAR; progress patient to next Phase/Stage  - Instruct patient to call for assistance with activity based on assessment  - Consider rehabilitation consult to assist with strengthening/weightbearing, etc   Outcome: Progressing     Problem: DISCHARGE PLANNING  Goal: Discharge to home or other facility with appropriate resources  Description: INTERVENTIONS:  - Identify barriers to discharge w/patient and caregiver  - Arrange for needed discharge resources and transportation as appropriate  - Identify discharge learning needs (meds, wound care, etc )  - Arrange for interpretive services to assist at discharge as needed  - Refer to Case Management Department for coordinating discharge planning if the patient needs post-hospital services based on physician/advanced practitioner order or complex needs related to functional status, cognitive ability, or social support system  Outcome: Progressing     Problem: Knowledge Deficit  Goal: Patient/family/caregiver demonstrates understanding of disease process, treatment plan, medications, and discharge instructions  Description: Complete learning assessment and assess knowledge base    Interventions:  - Provide teaching at level of understanding  - Provide teaching via preferred learning methods  Outcome: Progressing     Problem: CARDIOVASCULAR - ADULT  Goal: Maintains optimal cardiac output and hemodynamic stability  Description: INTERVENTIONS:  - Monitor I/O, vital signs and rhythm  - Monitor for S/S and trends of decreased cardiac output  - Administer and titrate ordered vasoactive medications to optimize hemodynamic stability  - Assess quality of pulses, skin color and temperature  - Assess for signs of decreased coronary artery perfusion  - Instruct patient to report change in severity of symptoms  Outcome: Progressing  Goal: Absence of cardiac dysrhythmias or at baseline rhythm  Description: INTERVENTIONS:  - Continuous cardiac monitoring, vital signs, obtain 12 lead EKG if ordered  - Administer antiarrhythmic and heart rate control medications as ordered  - Monitor electrolytes and administer replacement therapy as ordered  Outcome: Progressing     Problem: GASTROINTESTINAL - ADULT  Goal: Minimal or absence of nausea and/or vomiting  Description: INTERVENTIONS:  - Administer IV fluids if ordered to ensure adequate hydration  - Maintain NPO status until nausea and vomiting are resolved  - Nasogastric tube if ordered  - Administer ordered antiemetic medications as needed  - Provide nonpharmacologic comfort measures as appropriate  - Advance diet as tolerated, if ordered  - Consider nutrition services referral to assist patient with adequate nutrition and appropriate food choices  Outcome: Progressing  Goal: Maintains adequate nutritional intake  Description: INTERVENTIONS:  - Monitor percentage of each meal consumed  - Identify factors contributing to decreased intake, treat as appropriate  - Assist with meals as needed  - Monitor I&O, weight, and lab values if indicated  - Obtain nutrition services referral as needed  Outcome: Progressing     Problem: METABOLIC, FLUID AND ELECTROLYTES - ADULT  Goal: Electrolytes maintained within normal limits  Description: INTERVENTIONS:  - Monitor labs and assess patient for signs and symptoms of electrolyte imbalances  - Administer electrolyte replacement as ordered  - Monitor response to electrolyte replacements, including repeat lab results as appropriate  - Instruct patient on fluid and nutrition as appropriate  Outcome: Progressing  Goal: Fluid balance maintained  Description: INTERVENTIONS:  - Monitor labs   - Monitor I/O and WT  - Instruct patient on fluid and nutrition as appropriate  - Assess for signs & symptoms of volume excess or deficit  Outcome: Progressing  Goal: Glucose maintained within target range  Description: INTERVENTIONS:  - Monitor Blood Glucose as ordered  - Assess for signs and symptoms of hyperglycemia and hypoglycemia  - Administer ordered medications to maintain glucose within target range  - Assess nutritional intake and initiate nutrition service referral as needed  Outcome: Progressing     Problem: COPING  Goal: Pt/Family able to verbalize concerns and demonstrate effective coping strategies  Description: INTERVENTIONS:  - Assist patient/family to identify coping skills, available support systems and cultural and spiritual values  - Provide emotional support, including active listening and acknowledgement of concerns of patient and caregivers  - Reduce environmental stimuli, as able  - Provide patient education  - Assess for spiritual pain/suffering and initiate spiritual care, including notification of Pastoral Care or mani based community as needed  - Assess effectiveness of coping strategies  Outcome: Progressing  Goal: Will report anxiety at manageable levels  Description: INTERVENTIONS:  - Administer medication as ordered  - Teach and encourage coping skills  - Provide emotional support  - Assess patient/family for anxiety and ability to cope  Outcome: Progressing     Problem: SELF HARM  Goal: Effect of psychiatric condition will be minimized and patient will be protected from self harm  Description: INTERVENTIONS:  - Assess impact of patient's symptoms on level of functioning, self-care needs and offer support as indicated  - Assess patient/family knowledge of depression, impact on illness and need for teaching  - Provide emotional support, presence and reassurance  - Assess for possible suicidal thoughts, ideation or self-harm  If patient expresses suicidal thoughts or statements do not leave alone, notify physician/AP immediately, initiate suicide precautions, and determine need for continual observation    - initiate consults and referrals as appropriate (a mental health professional, Spiritual Care  Outcome: Progressing

## 2021-05-03 VITALS
OXYGEN SATURATION: 98 % | WEIGHT: 102 LBS | HEART RATE: 73 BPM | HEIGHT: 62 IN | BODY MASS INDEX: 18.77 KG/M2 | RESPIRATION RATE: 16 BRPM | DIASTOLIC BLOOD PRESSURE: 60 MMHG | TEMPERATURE: 98.5 F | SYSTOLIC BLOOD PRESSURE: 92 MMHG

## 2021-05-03 PROCEDURE — NC001 PR NO CHARGE: Performed by: INTERNAL MEDICINE

## 2021-05-03 PROCEDURE — 99239 HOSP IP/OBS DSCHRG MGMT >30: CPT | Performed by: INTERNAL MEDICINE

## 2021-05-03 PROCEDURE — 99213 OFFICE O/P EST LOW 20 MIN: CPT | Performed by: PSYCHIATRY & NEUROLOGY

## 2021-05-03 RX ORDER — AMOXICILLIN 250 MG
1 CAPSULE ORAL 2 TIMES DAILY
Status: DISCONTINUED | OUTPATIENT
Start: 2021-05-03 | End: 2021-05-03 | Stop reason: HOSPADM

## 2021-05-03 RX ORDER — POLYETHYLENE GLYCOL 3350 17 G/17G
17 POWDER, FOR SOLUTION ORAL DAILY
Qty: 510 G | Refills: 0 | Status: SHIPPED | OUTPATIENT
Start: 2021-05-04 | End: 2021-06-03

## 2021-05-03 RX ORDER — CALCIUM CARBONATE 200(500)MG
500 TABLET,CHEWABLE ORAL DAILY PRN
Refills: 0
Start: 2021-05-03

## 2021-05-03 RX ORDER — FAMOTIDINE 20 MG/1
20 TABLET, FILM COATED ORAL
Qty: 60 TABLET | Refills: 0 | Status: SHIPPED | OUTPATIENT
Start: 2021-05-03 | End: 2021-06-02

## 2021-05-03 RX ORDER — POLYETHYLENE GLYCOL 3350 17 G/17G
17 POWDER, FOR SOLUTION ORAL DAILY
Status: DISCONTINUED | OUTPATIENT
Start: 2021-05-03 | End: 2021-05-03 | Stop reason: HOSPADM

## 2021-05-03 RX ADMIN — DOCUSATE SODIUM AND SENNOSIDES 1 TABLET: 8.6; 5 TABLET ORAL at 11:57

## 2021-05-03 RX ADMIN — DOCUSATE SODIUM AND SENNOSIDES 1 TABLET: 8.6; 5 TABLET ORAL at 17:43

## 2021-05-03 RX ADMIN — FAMOTIDINE 20 MG: 20 TABLET ORAL at 17:43

## 2021-05-03 RX ADMIN — POLYETHYLENE GLYCOL 3350 17 G: 17 POWDER, FOR SOLUTION ORAL at 11:57

## 2021-05-03 NOTE — DISCHARGE SUMMARY
Discharge Summary - [facility]    Patient Information: Farida Ellsworth 23 y o  female MRN: [id number]  Unit/Bed#: [facility] 709-01 Encounter: [id number]    Discharging Physician / Practitioner: Karo Armstrong MD  PCP: Dianelys Kent  Admission Date: 4/30/2021  Discharge Date: 05/03/21    Disposition:     Other: Inpatient psychiatry    Reason for Admission:   Medication  Overdose with intention to self-harm    Discharge Diagnoses:     Principal Problem:    Tylenol overdose, intentional self-harm, initial encounter ([facility] )  Active Problems:    Prolonged QT interval    Hypokalemia    Nausea    Major depressive disorder with single episode  Resolved Problems:    * No resolved hospital problems  *      Consultations During Hospital Stay:  ·   Toxicology   · Psychiatry    Procedures Performed:     · None       Hospital Course:     Farida Ellsworth is a 23 y o  female patient who originally presented to the hospital on 4/30/2021 due to intentional medication overdose  Patient reported taking Tylenol Extra Strength 10 tablets, Excedrin, Zzzquil tablets  Patient was noted to have Tylenol toxicity she was treated with N-acetylcysteine  She was seen in consultation toxicologies her labs were closely monitored  Tylenol toxicity resolved  She was noted to have QT prolongation likely medication induced  She was monitored closely her QT prolongation resolved  Recommended avoidance of QT prolonging medications    Patient is symptomatically improved hemodynamically stable  She was evaluated by Psychiatry and is recommended inpatient psychiatry admission for further management  She is deemed medically stable for discharge to inpatient psychiatry  Kindly review the chart for details      Condition at Discharge: fair     Discharge Day Visit / Exam:     Subjective:      Comfortably sitting up in bed  Reports feeling better  Agreeable to discharge plan      Vitals: Blood Pressure: 92/60 (05/03/21 1448)  Pulse: 73 (05/03/21 1448)  Temperature: 98 5 °F (36 9 °C) (05/02/21 2157)  Temp Source: Oral (04/30/21 0455)  Respirations: 16 (05/03/21 1448)  Height: 5' 2" (157 5 cm) (04/30/21 0503)  Weight - Scale: 46 3 kg (102 lb) (04/30/21 0503)  SpO2: 98 % (05/03/21 1448)  Exam:   Physical Exam    Comfortably sitting up in bed  Neck supple  Lungs diminished breath sounds bilateral  Heart sounds S1-S2 noted  Abdomen soft nontender  Awake obeys simple commands  Pulses noted  No rash  No pedal edema    Discharge instructions/Information to patient and family:   See after visit summary for information provided to patient and family  Discharge plan discussed with the patient, offered to call family patient declined  Outpatient follow-up with Psychiatry primary care physician     Provisions for Follow-Up Care:  See after visit summary for information related to follow-up care and any pertinent home health orders  Planned Readmission: no     Discharge Statement:  I spent 45 minutes discharging the patient  This time was spent on the day of discharge  I had direct contact with the patient on the day of discharge  Greater than 50% of the total time was spent examining patient, answering all patient questions, arranging and discussing plan of care with patient as well as directly providing post-discharge instructions  Additional time then spent on discharge activities  Discharge Medications:  See after visit summary for reconciled discharge medications provided to patient and family        ** Please Note: This note has been constructed using a voice recognition system **

## 2021-05-03 NOTE — PROGRESS NOTES
Progress Note - Behavioral Health   Sudarshan Calle 23 y o  female MRN: [id number]  Unit/Bed#: [facility] 709-01 Encounter: [id number]        I came to see the patient continuation of care, patient continued to be very depressed, she complained headache  She only worries about the insurance, she had no insight in her condition, she does not see how the overdose have impacted her  She has sleep difficulties, poor appetite, anhedonia, she feels hopeless and helpless  Behavior over the last 24 hours:  unchanged  Sleep: insomnia  Appetite: poor  Medication side effects: No  ROS: headache    Mental Status Evaluation:  Appearance:  age appropriate and disheveled   Behavior:  guarded   Speech:  soft   Mood:  depressed   Affect:  constricted   Language: naming objects and repeating phrases   Thought Process:  goal directed   Associations: intact associations   Thought Content:  normal   Perceptual Disturbances: None   Risk Potential: Status post overdose   Sensorium:  person, place, time/date and situation   Memory:  recent and remote memory grossly intact   Cognition:  recent and remote memory grossly intact   Consciousness:  alert and awake    Attention: attention span and concentration were age appropriate   Intellect: within normal limits   Fund of Knowledge: awareness of current events: Good, past history: Good and vocabulary: Good   Insight:  limited   Judgment: limited   Muscle Strength and Tone: Within normal limits   Gait/Station: normal gait/station and normal balance   Motor Activity: no abnormal movements         Assessment/Plan  Anabelle Kumar is a 23 y o  female with depression and anxiety presented to the hospital after an overdose with multiple medication is a suicidal attempt, she had been feeling very depressed, she had poor concentration, no motivation, poor sleep, poor appetite, anhedonia, she had no insight in her condition  She had limited support system    She continued to be danger to herself  Diagnosis:  Major depressive disorder single episode severe without psychotic feature    Recommended Treatment:   Continue medical management  Continue one-to-one observation  Inpatient psych admission medically cleared and bed available patient is a 2    Medications:   current meds:   Current Facility-Administered Medications   Medication Dose Route Frequency    aluminum-magnesium hydroxide-simethicone (MYLANTA) oral suspension 30 mL  30 mL Oral Q4H PRN    bisacodyl (DULCOLAX) EC tablet 5 mg  5 mg Oral Daily PRN    calcium carbonate (TUMS) chewable tablet 500 mg  500 mg Oral Daily PRN    famotidine (PEPCID) tablet 20 mg  20 mg Oral BID AC    ibuprofen (MOTRIN) tablet 200 mg  200 mg Oral Q8H PRN    polyethylene glycol (MIRALAX) packet 17 g  17 g Oral Daily    senna-docusate sodium (SENOKOT S) 8 6-50 mg per tablet 1 tablet  1 tablet Oral BID         Risks, benefits and possible side effects of Medications:     Risks, benefits, and possible side effects of medications explained to patient and patient verbalizes understanding  Labs: I have personally reviewed all pertinent laboratory results  I have personally reviewed all pertinent laboratory/tests results    Acetaminophen/Salicylate level   Lab Results   Component Value Date    ACTMNPHEN <2 (L) 67/73/5473    SALICYLATE 10 23/01/6073           Otf Helms MD

## 2021-05-03 NOTE — PROGRESS NOTES
[facility]  Progress Note Dorsie Less 2000, 23 y o  female MRN: [id number]  Unit/Bed#: [facility] 709-01 Encounter: [id number]  Primary Care Provider: Sebastian Larson   Date and time admitted to hospital: 4/30/2021  4:36 AM    * Tylenol overdose, intentional self-harm, initial encounter [facility])  Assessment & Plan  Poly drug overdose with intention to self-harm  Clinically improved  Toxicology input noted      Major depressive disorder with single episode  Assessment & Plan  Patient with major depressive disorder with polydrug overdose with suicidal intent  Continue one-to-one continuous observation  Psychiatry input noted  Awaiting transfer to inpatient psychiatry    Hypokalemia  Assessment & Plan  Replete  Monitor    Prolonged QT interval  Assessment & Plan  · QT prolongation resolved  · Avoid QT prolonging medications        VTE Pharmacologic Prophylaxis:   Pharmacologic: Vena dynes  Mechanical VTE Prophylaxis in Place: Yes    Patient Centered Rounds: I have performed bedside rounds with nursing staff today  Discussions with Specialists or Other Care Team Provider:     Education and Discussions with Family / Patient:  Discussed with the patient, offered to call family patient declined reports she is keeping her mother updated    Time Spent for Care: 30 minutes  More than 50% of total time spent on counseling and coordination of care as described above  Current Length of Stay: 3 day(s)    Current Patient Status: Inpatient   Certification Statement: The patient will continue to require additional inpatient hospital stay due to As outlined    Discharge Plan:  Pending placement inpatient psychiatry case management following    Code Status: Level 1 - Full Code      Subjective:     Comfortably in bed  Reports feeling okay  Nausea improving  Tolerating p o    Encouraged out of bed and ambulation as able she verbalized understanding      Objective:     Vitals:   Temp (24hrs), Av 5 °F (36 9 °C), Min:98 4 °F (36 9 °C), Max:98 5 °F (36 9 °C)    Temp:  [98 4 °F (36 9 °C)-98 5 °F (36 9 °C)] 98 5 °F (36 9 °C)  HR:  [] 87  Resp:  [17] 17  BP: (107-135)/() 135/109  SpO2:  [98 %-99 %] 99 %  Body mass index is 18 66 kg/m²  Input and Output Summary (last 24 hours): Intake/Output Summary (Last 24 hours) at 5/3/2021 1137  Last data filed at 2021 1737  Gross per 24 hour   Intake 460 ml   Output --   Net 460 ml       Physical Exam:     Physical Exam    Comfortably in bed  Neck supple  Lungs clear to auscultation  No additional sounds  Heart sounds S1 and S2 noted  No murmurs appreciable  Abdomen soft nontender  Awake alert obeys simple commands  Pulses noted  No rash    Additional Data:     Labs:    Results from last 7 days   Lab Units 21  0500   WBC Thousand/uL 7 43   HEMOGLOBIN g/dL 12 8   HEMATOCRIT % 40 4   PLATELETS Thousands/uL 385   NEUTROS PCT % 49   LYMPHS PCT % 41   MONOS PCT % 8   EOS PCT % 1     Results from last 7 days   Lab Units 21  0435   SODIUM mmol/L 140   POTASSIUM mmol/L 3 8   CHLORIDE mmol/L 110*   CO2 mmol/L 25   BUN mg/dL 10   CREATININE mg/dL 0 79   ANION GAP mmol/L 5   CALCIUM mg/dL 9 0   ALBUMIN g/dL 3 8   TOTAL BILIRUBIN mg/dL 0 43   ALK PHOS U/L 70   ALT U/L 18   AST U/L 9   GLUCOSE RANDOM mg/dL 83     Results from last 7 days   Lab Units 21  0435   INR  1 15                       * I Have Reviewed All Lab Data Listed Above  * Additional Pertinent Lab Tests Reviewed:  All Labs Within Last 24 Hours Reviewed    Imaging:    Imaging Reports Reviewed Today Include:   Imaging Personally Reviewed by Myself Includes:      Recent Cultures (last 7 days):           Last 24 Hours Medication List:   Current Facility-Administered Medications   Medication Dose Route Frequency Provider Last Rate    aluminum-magnesium hydroxide-simethicone  30 mL Oral Q4H PRN Shelley Mcneal MD      calcium carbonate  500 mg Oral Daily PRN Jamison Garcia MD      famotidine  20 mg Oral BID AC Jamison Garcia MD      ibuprofen  200 mg Oral Q8H PRN Jamison Garcia MD          Today, Patient Was Seen By: Jamison Garcia MD    ** Please Note: Dictation voice to text software may have been used in the creation of this document   **

## 2021-05-03 NOTE — PLAN OF CARE
Problem: PAIN - ADULT  Goal: Verbalizes/displays adequate comfort level or baseline comfort level  Description: Interventions:  - Encourage patient to monitor pain and request assistance  - Assess pain using appropriate pain scale  - Administer analgesics based on type and severity of pain and evaluate response  - Implement non-pharmacological measures as appropriate and evaluate response  - Consider cultural and social influences on pain and pain management  - Notify physician/advanced practitioner if interventions unsuccessful or patient reports new pain  Outcome: Progressing     Problem: INFECTION - ADULT  Goal: Absence or prevention of progression during hospitalization  Description: INTERVENTIONS:  - Assess and monitor for signs and symptoms of infection  - Monitor lab/diagnostic results  - Monitor all insertion sites, i e  indwelling lines, tubes, and drains  - Monitor endotracheal if appropriate and nasal secretions for changes in amount and color  - [place] appropriate cooling/warming therapies per order  - Administer medications as ordered  - Instruct and encourage patient and family to use good hand hygiene technique  - Identify and instruct in appropriate isolation precautions for identified infection/condition  Outcome: Progressing  Goal: Absence of fever/infection during neutropenic period  Description: INTERVENTIONS:  - Monitor WBC    Outcome: Progressing     Problem: SAFETY ADULT  Goal: Patient will remain free of falls  Description: INTERVENTIONS:  - Assess patient frequently for physical needs  -  Identify cognitive and physical deficits and behaviors that affect risk of falls    -  [place] fall precautions as indicated by assessment   - Educate patient/family on patient safety including physical limitations  - Instruct patient to call for assistance with activity based on assessment  - Modify environment to reduce risk of injury  - Consider OT/PT consult to assist with strengthening/mobility  Outcome: Progressing  Goal: Maintain or return to baseline ADL function  Description: INTERVENTIONS:  -  Assess patient's ability to carry out ADLs; assess patient's baseline for ADL function and identify physical deficits which impact ability to perform ADLs (bathing, care of mouth/teeth, toileting, grooming, dressing, etc )  - Assess/evaluate cause of self-care deficits   - Assess range of motion  - Assess patient's mobility; develop plan if impaired  - Assess patient's need for assistive devices and provide as appropriate  - Encourage maximum independence but intervene and supervise when necessary  - Involve family in performance of ADLs  - Assess for home care needs following discharge   - Consider OT consult to assist with ADL evaluation and planning for discharge  - Provide patient education as appropriate  Outcome: Progressing  Goal: Maintain or return mobility status to optimal level  Description: INTERVENTIONS:  - Assess patient's baseline mobility status (ambulation, transfers, stairs, etc )    - Identify cognitive and physical deficits and behaviors that affect mobility  - Identify mobility aids required to assist with transfers and/or ambulation (gait belt, sit-to-stand, lift, walker, cane, etc )  - [place] fall precautions as indicated by assessment  - Record patient progress and toleration of activity level on Mobility SBAR; progress patient to next Phase/Stage  - Instruct patient to call for assistance with activity based on assessment  - Consider rehabilitation consult to assist with strengthening/weightbearing, etc   Outcome: Progressing     Problem: DISCHARGE PLANNING  Goal: Discharge to home or other facility with appropriate resources  Description: INTERVENTIONS:  - Identify barriers to discharge w/patient and caregiver  - Arrange for needed discharge resources and transportation as appropriate  - Identify discharge learning needs (meds, wound care, etc )  - Arrange for interpretive services to assist at discharge as needed  - Refer to Case Management Department for coordinating discharge planning if the patient needs post-hospital services based on physician/advanced practitioner order or complex needs related to functional status, cognitive ability, or social support system  Outcome: Progressing     Problem: Knowledge Deficit  Goal: Patient/family/caregiver demonstrates understanding of disease process, treatment plan, medications, and discharge instructions  Description: Complete learning assessment and assess knowledge base    Interventions:  - Provide teaching at level of understanding  - Provide teaching via preferred learning methods  Outcome: Progressing     Problem: CARDIOVASCULAR - ADULT  Goal: Maintains optimal cardiac output and hemodynamic stability  Description: INTERVENTIONS:  - Monitor I/O, vital signs and rhythm  - Monitor for S/S and trends of decreased cardiac output  - Administer and titrate ordered vasoactive medications to optimize hemodynamic stability  - Assess quality of pulses, skin color and temperature  - Assess for signs of decreased coronary artery perfusion  - Instruct patient to report change in severity of symptoms  Outcome: Progressing  Goal: Absence of cardiac dysrhythmias or at baseline rhythm  Description: INTERVENTIONS:  - Continuous cardiac monitoring, vital signs, obtain 12 lead EKG if ordered  - Administer antiarrhythmic and heart rate control medications as ordered  - Monitor electrolytes and administer replacement therapy as ordered  Outcome: Progressing     Problem: GASTROINTESTINAL - ADULT  Goal: Minimal or absence of nausea and/or vomiting  Description: INTERVENTIONS:  - Administer IV fluids if ordered to ensure adequate hydration  - Maintain NPO status until nausea and vomiting are resolved  - Nasogastric tube if ordered  - Administer ordered antiemetic medications as needed  - Provide nonpharmacologic comfort measures as appropriate  - Advance diet as tolerated, if ordered  - Consider nutrition services referral to assist patient with adequate nutrition and appropriate food choices  Outcome: Progressing  Goal: Maintains adequate nutritional intake  Description: INTERVENTIONS:  - Monitor percentage of each meal consumed  - Identify factors contributing to decreased intake, treat as appropriate  - Assist with meals as needed  - Monitor I&O, weight, and lab values if indicated  - Obtain nutrition services referral as needed  Outcome: Progressing     Problem: METABOLIC, FLUID AND ELECTROLYTES - ADULT  Goal: Electrolytes maintained within normal limits  Description: INTERVENTIONS:  - Monitor labs and assess patient for signs and symptoms of electrolyte imbalances  - Administer electrolyte replacement as ordered  - Monitor response to electrolyte replacements, including repeat lab results as appropriate  - Instruct patient on fluid and nutrition as appropriate  Outcome: Progressing  Goal: Fluid balance maintained  Description: INTERVENTIONS:  - Monitor labs   - Monitor I/O and WT  - Instruct patient on fluid and nutrition as appropriate  - Assess for signs & symptoms of volume excess or deficit  Outcome: Progressing  Goal: Glucose maintained within target range  Description: INTERVENTIONS:  - Monitor Blood Glucose as ordered  - Assess for signs and symptoms of hyperglycemia and hypoglycemia  - Administer ordered medications to maintain glucose within target range  - Assess nutritional intake and initiate nutrition service referral as needed  Outcome: Progressing     Problem: COPING  Goal: Pt/Family able to verbalize concerns and demonstrate effective coping strategies  Description: INTERVENTIONS:  - Assist patient/family to identify coping skills, available support systems and cultural and spiritual values  - Provide emotional support, including active listening and acknowledgement of concerns of patient and caregivers  - Reduce environmental stimuli, as able  - Provide patient education  - Assess for spiritual pain/suffering and initiate spiritual care, including notification of Pastoral Care or mani based community as needed  - Assess effectiveness of coping strategies  Outcome: Progressing  Goal: Will report anxiety at manageable levels  Description: INTERVENTIONS:  - Administer medication as ordered  - Teach and encourage coping skills  - Provide emotional support  - Assess patient/family for anxiety and ability to cope  Outcome: Progressing     Problem: SELF HARM  Goal: Effect of psychiatric condition will be minimized and patient will be protected from self harm  Description: INTERVENTIONS:  - Assess impact of patient's symptoms on level of functioning, self-care needs and offer support as indicated  - Assess patient/family knowledge of depression, impact on illness and need for teaching  - Provide emotional support, presence and reassurance  - Assess for possible suicidal thoughts, ideation or self-harm  If patient expresses suicidal thoughts or statements do not leave alone, notify physician/AP immediately, initiate suicide precautions, and determine need for continual observation    - initiate consults and referrals as appropriate (a mental health professional, Spiritual Care  Outcome: Progressing

## 2021-05-03 NOTE — CASE MANAGEMENT
CM was informed that pt is medically stable for dc today  CM called pt's [employer] insurance--spoke to [place] who states [address]   is primary  Pt made aware of same  CM spoke to [facility] crisis who states no availability within the network  CM spoke to Anam at [address] who states no beds available  Met with pt to discuss same  Pt is agreeable to expanding the search  CM called multiple IP treatment center:    [facility]--voicemail left  [place]--spoke to [facility]; beds available  CM faxed  clinical  Received a call back from [facility] who  states they're not in-network with pt's secondary  Angela King--spoke to [place] who states beds are available  CM faxed [facility]--spoke to [place]; beds available  CM faxed clinical  Received a call back from [place];  she reports they no longer have availability  [place]--spoke to [place]; no beds available  [place]--spoke to [place]; no beds available  [place]--spoke to Angel Luis Griffith; beds available  CM faxed [facility]--spoke to [facility]; no beds available  [facility]--spoke to [facility] crum; no beds available  [facility]--spoke to Rhoda carolyne; no beds available  [facility]--spoke to [place]; no beds available  [place]--spoke to [place]; no beds available  [place]--spoke to [facility]; beds available  CM faxed  [facility]--spoke to Nathalie Stanley; no beds available  [facility]--spoke to [place]; beds available  CM  faxed clinical  Received a call back--[place] reports  they're out of network with pt's insurance  [facility]--spoke to [place]; beds available  CM faxed  [facility]--spoke to [place]; no beds available  [facility]--spoke to [facility]; beds available  Clinical faxed  [facility]--spoke to [facility]; beds  available  CM faxed clinical  Received a call back  stating no beds available

## 2021-05-03 NOTE — CASE MANAGEMENT
Received a call from Annelise Boss at [facility] [phone] who states pt is accepted and a bed is available tomorrow  Received a call from Eloisa at [place] [phone] who states pt is accepted and a bed is available today  Met with pt to discuss both options; pt is requesting [facility]  CM arranged with [facility] transport for a 7pm dc to [facility]  CM notified pt and Eloisa at [facility] of dc time  Pt states she informed her family  Original [address] in envelope for EMS  Eloisa informed CM that she will receive precert from [address] [place] [address]

## 2021-05-04 NOTE — UTILIZATION REVIEW
Notification of Discharge   This is a Notification of Discharge from our facility [address]  Please be advised that this patient has been discharge from our facility  Below you will find the admission and discharge date and time including the patients disposition  UTILIZATION REVIEW CONTACT:  Rosmery Gracia  Utilization   Network Utilization Review Department  Phone: [phone] x carefully listen to the prompts  All voicemails are confidential   Email: [email or web address]     PHYSICIAN ADVISORY SERVICES:  FOR [facility]AA-RX-EDSP REVIEW - MEDICAL NECESSITY DENIAL  Phone: [phone]  Fax: [phone]  Email: [email or web address]     PRESENTATION DATE: 4/30/2021  4:36 AM  OBERVATION ADMISSION DATE:   INPATIENT ADMISSION DATE: 4/30/21 0706   DISCHARGE DATE: 5/3/2021  7:42 PM  DISPOSITION: Non [facility] Non [facility]      IMPORTANT INFORMATION:  Send all requests for admission clinical reviews, approved or denied determinations and any other requests to dedicated fax number below belonging to the campus where the patient is receiving treatment   List of dedicated fax numbers:  [address] [place] [address] DENIALS (Administrative/Medical Necessity) [phone]   [address] (Maternity/NICU/Pediatrics) [phone]   Khushboo Peterson [phone]   [facility] [phone]   Mauricio Llanos [phone]   [facility] [address] [facility] [phone]   [facility]  [phone]   [address] [phone]